# Patient Record
Sex: MALE | Race: WHITE | NOT HISPANIC OR LATINO | Employment: FULL TIME | URBAN - METROPOLITAN AREA
[De-identification: names, ages, dates, MRNs, and addresses within clinical notes are randomized per-mention and may not be internally consistent; named-entity substitution may affect disease eponyms.]

---

## 2017-01-19 ENCOUNTER — OFFICE VISIT (OUTPATIENT)
Dept: URGENT CARE | Facility: PHYSICIAN GROUP | Age: 52
End: 2017-01-19
Payer: COMMERCIAL

## 2017-01-19 VITALS
DIASTOLIC BLOOD PRESSURE: 86 MMHG | BODY MASS INDEX: 30.47 KG/M2 | HEART RATE: 83 BPM | WEIGHT: 186 LBS | RESPIRATION RATE: 16 BRPM | SYSTOLIC BLOOD PRESSURE: 122 MMHG | TEMPERATURE: 98.3 F | OXYGEN SATURATION: 96 %

## 2017-01-19 DIAGNOSIS — R07.89 FEELING OF CHEST TIGHTNESS: ICD-10-CM

## 2017-01-19 DIAGNOSIS — R06.02 SOB (SHORTNESS OF BREATH) ON EXERTION: ICD-10-CM

## 2017-01-19 DIAGNOSIS — J40 BRONCHITIS: ICD-10-CM

## 2017-01-19 DIAGNOSIS — R05.9 COUGH: ICD-10-CM

## 2017-01-19 DIAGNOSIS — J45.20 MILD INTERMITTENT ASTHMA WITHOUT COMPLICATION: ICD-10-CM

## 2017-01-19 PROCEDURE — 99214 OFFICE O/P EST MOD 30 MIN: CPT | Performed by: NURSE PRACTITIONER

## 2017-01-19 RX ORDER — METHYLPREDNISOLONE 4 MG/1
TABLET ORAL
Qty: 1 KIT | Refills: 0 | Status: CANCELLED | OUTPATIENT
Start: 2017-01-19

## 2017-01-19 RX ORDER — AZITHROMYCIN 250 MG/1
TABLET, FILM COATED ORAL
Qty: 6 TAB | Refills: 0 | Status: SHIPPED | OUTPATIENT
Start: 2017-01-19 | End: 2017-02-13

## 2017-01-19 RX ORDER — BENZONATATE 100 MG/1
100 CAPSULE ORAL 3 TIMES DAILY PRN
Qty: 30 CAP | Refills: 0 | Status: SHIPPED | OUTPATIENT
Start: 2017-01-19 | End: 2017-02-13

## 2017-01-19 RX ORDER — IPRATROPIUM BROMIDE AND ALBUTEROL SULFATE 2.5; .5 MG/3ML; MG/3ML
3 SOLUTION RESPIRATORY (INHALATION) ONCE
Status: COMPLETED | OUTPATIENT
Start: 2017-01-19 | End: 2017-01-19

## 2017-01-19 RX ORDER — ALBUTEROL SULFATE 90 UG/1
2 AEROSOL, METERED RESPIRATORY (INHALATION) EVERY 6 HOURS PRN
Qty: 8.5 G | Refills: 0 | Status: SHIPPED | OUTPATIENT
Start: 2017-01-19 | End: 2017-06-15

## 2017-01-19 RX ADMIN — IPRATROPIUM BROMIDE AND ALBUTEROL SULFATE 3 ML: 2.5; .5 SOLUTION RESPIRATORY (INHALATION) at 13:30

## 2017-01-19 ASSESSMENT — ENCOUNTER SYMPTOMS
SPUTUM PRODUCTION: 0
DIZZINESS: 0
PALPITATIONS: 0
COUGH: 1
BACK PAIN: 0
WHEEZING: 0
HEADACHES: 0
SORE THROAT: 0
CHILLS: 0
MYALGIAS: 0
ORTHOPNEA: 0
WEAKNESS: 0
FEVER: 0
SHORTNESS OF BREATH: 1

## 2017-01-19 NOTE — MR AVS SNAPSHOT
Sathya Guaman Dustin   2017 1:00 PM   Office Visit   MRN: [id number]    Department:  [facility]   Dept Phone:  [phone]    Description:  Male : 1965   Provider:  SHAWN Munson           Reason for Visit     Cough cough since before thanksgiving, SOB      Allergies as of 2017     No Known Allergies      You were diagnosed with     Cough   [786.2.ICD-9-CM]       Bronchitis   [770827]       Feeling of chest tightness   [445402]       SOB (shortness of breath) on exertion   [803087]       Mild intermittent asthma without complication   [829261]         Vital Signs     Blood Pressure Pulse Temperature Respirations Weight Oxygen Saturation    122/86 mmHg 83 36.8 °C (98.3 °F) 16 84.369 kg (186 lb) 96%    Smoking Status                   Former Smoker           Basic Information     Date Of Birth Sex Race Ethnicity Preferred Language    1965 Male White Non- English      Problem List              ICD-10-CM Priority Class Noted - Resolved    Essential hypertension I10   2015 - Present    Hyperlipidemia E78.5   2015 - Present    Vitamin D deficiency E55.9   2015 - Present    Low testosterone E29.1   2015 - Present    BMI 30.0-30.9,adult Z68.30   10/3/2016 - Present      Health Maintenance        Date Due Completion Dates    IMM INFLUENZA (1) 2016 ---    COLONOSCOPY 10/3/2025 10/3/2015 (Done)    Override on 10/3/2015: Done    IMM DTaP/Tdap/Td Vaccine (2 - Td) 10/3/2026 10/3/2016            Current Immunizations     Tdap Vaccine 10/3/2016      Below and/or attached are the medications your provider expects you to take. Review all of your home medications and newly ordered medications with your provider and/or pharmacist. Follow medication instructions as directed by your provider and/or pharmacist. Please keep your medication list with you and share with your provider. Update the information when medications are discontinued, doses are changed, or  new medications (including over-the-counter products) are added; and carry medication information at all times in the event of emergency situations     Allergies:  No Known Allergies          Medications  Valid as of: January 19, 2017 -  2:18 PM    Generic Name Brand Name Tablet Size Instructions for use    Cholecalciferol   Take  by mouth.        Lisinopril-Hydrochlorothiazide (Tab) PRINZIDE, ZESTORETIC 20-25 MG Take 1 Tab by mouth every day.        Multiple Vitamins-Minerals   Take  by mouth.        Omega-3 Fatty Acids (Cap) fish oil 1000 MG Take 1,000 mg by mouth 3 times a day, with meals.        .                 Medicines prescribed today were sent to:     [facility]/PHARMACY #[id number] - [place], [place] - [address]    [address] [address]    Phone: [phone] Fax: [phone]    Open 24 Hours?: No      Medication refill instructions:       If your prescription bottle indicates you have medication refills left, it is not necessary to call your provider’s office. Please contact your pharmacy and they will refill your medication.    If your prescription bottle indicates you do not have any refills left, you may request refills at any time through one of the following ways: The online [employer] system (except Urgent Care), by calling your provider’s office, or by asking your pharmacy to contact your provider’s office with a refill request. Medication refills are processed only during regular business hours and may not be available until the next business day. Your provider may request additional information or to have a follow-up visit with you prior to refilling your medication.   *Please Note: Medication refills are assigned a new Rx number when refilled electronically. Your pharmacy may indicate that no refills were authorized even though a new prescription for the same medication is available at the pharmacy. Please request the medicine by name with the pharmacy before contacting your  provider for a refill.           MyChart Access Code: Activation code not generated  Current MyChart Status: Active

## 2017-01-19 NOTE — PROGRESS NOTES
"Subjective:      Sathya Chan is a 51 y.o. male who presents with Cough            Cough  Associated symptoms include shortness of breath. Pertinent negatives include no chest pain, chills, ear pain, fever, headaches, myalgias, sore throat or wheezing. There is no history of environmental allergies.   Sathya is a 51 year old male who is here for cough since \"before Thanksgiving\". C/o dry hacking cough with some SOB with exertion and chest tightness. States h/o childhood asthma. No inhaler use. No OTC med for cough. Sleeping at night. Denies sinus problems, sore throat, fever.    PMH:  has a past medical history of Hypertension; Hyperlipidemia; and Asthma. He also has no past medical history of Diabetes (CMS-HCC).  MEDS:   Current outpatient prescriptions:   •  azithromycin (ZITHROMAX) 250 MG Tab, Take 2 tabs by mouth on day 1, then take 1 tab on days 2-5, Disp: 6 Tab, Rfl: 0  •  benzonatate (TESSALON) 100 MG Cap, Take 1 Cap by mouth 3 times a day as needed for Cough., Disp: 30 Cap, Rfl: 0  •  lisinopril-hydrochlorothiazide (PRINZIDE, ZESTORETIC) 20-25 MG per tablet, Take 1 Tab by mouth every day., Disp: 90 Tab, Rfl: 2  •  Cholecalciferol (VITAMIN D PO), Take  by mouth., Disp: , Rfl:   •  Omega-3 Fatty Acids (FISH OIL) 1000 MG Cap capsule, Take 1,000 mg by mouth 3 times a day, with meals., Disp: , Rfl:   •  Multiple Vitamins-Minerals (MULTIVITAMIN PO), Take  by mouth., Disp: , Rfl:     Current facility-administered medications:   •  ipratropium-albuterol (DUONEB) nebulizer solution 3 mL, 3 mL, Nebulization, Once, Karen Talbot, F.N.P.  ALLERGIES: No Known Allergies  SURGHX: History reviewed. No pertinent past surgical history.  SOCHX:  reports that he quit smoking about 4 years ago. His smoking use included Cigarettes. He has never used smokeless tobacco. He reports that he drinks about 1.0 oz of alcohol per week. He reports that he does not use illicit drugs.  FH: Family history was reviewed, no pertinent " findings to report      Review of Systems   Constitutional: Negative for fever, chills and malaise/fatigue.   HENT: Negative for congestion, ear pain and sore throat.    Respiratory: Positive for cough and shortness of breath. Negative for sputum production and wheezing.    Cardiovascular: Negative for chest pain, palpitations and orthopnea.   Musculoskeletal: Negative for myalgias and back pain.   Neurological: Negative for dizziness, weakness and headaches.   Endo/Heme/Allergies: Negative for environmental allergies.          Objective:     /86 mmHg  Pulse 83  Temp(Src) 36.8 °C (98.3 °F)  Resp 16  Wt 84.369 kg (186 lb)  SpO2 96%     Physical Exam   Constitutional: He is oriented to person, place, and time. He appears well-developed and well-nourished. No distress.   HENT:   Head: Normocephalic.   Eyes: Conjunctivae and EOM are normal. Pupils are equal, round, and reactive to light.   Neck: Normal range of motion. Neck supple.   Cardiovascular: Normal rate and regular rhythm.    Pulmonary/Chest: Effort normal and breath sounds normal. No accessory muscle usage. No respiratory distress. He has no decreased breath sounds. He has no wheezes. He has no rhonchi. He has no rales.   Musculoskeletal: Normal range of motion.   Neurological: He is alert and oriented to person, place, and time.   Skin: Skin is warm and dry. He is not diaphoretic.   Vitals reviewed.  Duo Neb breathing treatment: Patient has chest tightness, SOB, dry cough without wheeze or CP. Cough induced especially with deep breathing. After, patient states able to breathe deep without coughing. Air movement throughout. Post tx 100%.              Assessment/Plan:     1. Cough    - ipratropium-albuterol (DUONEB) nebulizer solution 3 mL; 3 mL by Nebulization route Once.  - benzonatate (TESSALON) 100 MG Cap; Take 1 Cap by mouth 3 times a day as needed for Cough.  Dispense: 30 Cap; Refill: 0  - albuterol 108 (90 BASE) MCG/ACT Aero Soln inhalation  aerosol; Inhale 2 Puffs by mouth every 6 hours as needed.  Dispense: 8.5 g; Refill: 0    2. Bronchitis    - azithromycin (ZITHROMAX) 250 MG Tab; Take 2 tabs by mouth on day 1, then take 1 tab on days 2-5  Dispense: 6 Tab; Refill: 0  - albuterol 108 (90 BASE) MCG/ACT Aero Soln inhalation aerosol; Inhale 2 Puffs by mouth every 6 hours as needed.  Dispense: 8.5 g; Refill: 0    3. Feeling of chest tightness    - albuterol 108 (90 BASE) MCG/ACT Aero Soln inhalation aerosol; Inhale 2 Puffs by mouth every 6 hours as needed.  Dispense: 8.5 g; Refill: 0    4. SOB (shortness of breath) on exertion    - ipratropium-albuterol (DUONEB) nebulizer solution 3 mL; 3 mL by Nebulization route Once.  - albuterol 108 (90 BASE) MCG/ACT Aero Soln inhalation aerosol; Inhale 2 Puffs by mouth every 6 hours as needed.  Dispense: 8.5 g; Refill: 0    5. Mild intermittent asthma without complication    - albuterol 108 (90 BASE) MCG/ACT Aero Soln inhalation aerosol; Inhale 2 Puffs by mouth every 6 hours as needed.  Dispense: 8.5 g; Refill: 0    Increase water intake  May use Ibuprofen/Tylenol prn for fever or body aches  Get rest  May use daily longer acting antihistamine like Claritin  Use inhaler prn for SOB with cough/chest tightness  May use OTC cough suppressant medications like Robitussin prn for nighttime   Monitor for fevers, productive cough, SOB, CP, chest tightness- need re-evaluation

## 2017-02-13 ENCOUNTER — OFFICE VISIT (OUTPATIENT)
Dept: MEDICAL GROUP | Facility: PHYSICIAN GROUP | Age: 52
End: 2017-02-13
Payer: COMMERCIAL

## 2017-02-13 VITALS
OXYGEN SATURATION: 98 % | HEART RATE: 67 BPM | BODY MASS INDEX: 28.28 KG/M2 | SYSTOLIC BLOOD PRESSURE: 118 MMHG | HEIGHT: 66 IN | WEIGHT: 176 LBS | DIASTOLIC BLOOD PRESSURE: 60 MMHG | TEMPERATURE: 98.2 F | RESPIRATION RATE: 16 BRPM

## 2017-02-13 DIAGNOSIS — Z87.09 HISTORY OF ASTHMA: ICD-10-CM

## 2017-02-13 DIAGNOSIS — I10 ESSENTIAL HYPERTENSION: ICD-10-CM

## 2017-02-13 DIAGNOSIS — E78.5 HYPERLIPIDEMIA, UNSPECIFIED HYPERLIPIDEMIA TYPE: ICD-10-CM

## 2017-02-13 PROCEDURE — 99214 OFFICE O/P EST MOD 30 MIN: CPT | Performed by: INTERNAL MEDICINE

## 2017-02-13 NOTE — ASSESSMENT & PLAN NOTE
"Chronic condition. He is not on a cholesterol medication - has been on in the past but d/dominique due to elevated LFT's. Continues taking  fish oil 1000 mg 2 tab once a day.  Patient states that there was a program that work for losing weight \"biggest loser, \" which had given him some motivation to lose weight. He notes he is interested in seeing a dietitian/nutritionist and is requesting referral.   Patients most recent cholesterol was reviewed:  Lab Results   Component Value Date/Time    CHOLESTEROL, 10/06/2016 06:08 AM    CHOLESTEROL, 09/25/2015 06:29 AM     Lab Results   Component Value Date/Time    * 10/06/2016 06:08 AM    * 09/25/2015 06:29 AM     Lab Results   Component Value Date/Time    HDL 40 10/06/2016 06:08 AM    HDL 37* 09/25/2015 06:29 AM     Lab Results   Component Value Date/Time    TRIGLYCERIDES 205* 10/06/2016 06:08 AM    TRIGLYCERIDES 306* 09/25/2015 06:29 AM     "

## 2017-02-13 NOTE — ASSESSMENT & PLAN NOTE
Chronic condition. He indicates that he is feeling well and denies any symptoms referable to his elevated blood pressure. Specifically denies chest pain, palpitations, dyspnea, orthopnea, PND or peripheral edema. Current medication regimen is as listed below. Taking medicines as directed daily and is not taking baby aspirin. He is not monitoring BP at home. Patient has these side effects of medication: none. Currently on Prinzide 20-25 milligrams daily.  Lab Results   Component Value Date/Time    SODIUM 139 10/06/2016 06:08 AM    POTASSIUM 3.8 10/06/2016 06:08 AM    CHLORIDE 102 10/06/2016 06:08 AM    CO2 29 10/06/2016 06:08 AM    GLUCOSE 85 10/06/2016 06:08 AM    BUN 17 10/06/2016 06:08 AM    CREATININE 0.96 10/06/2016 06:08 AM    ALKALINE PHOSPHATASE 53 10/06/2016 06:08 AM    AST(SGOT) 24 10/06/2016 06:08 AM    ALT(SGPT) 31 10/06/2016 06:08 AM    TOTAL BILIRUBIN 0.6 10/06/2016 06:08 AM

## 2017-02-13 NOTE — MR AVS SNAPSHOT
"Sathya Guaman Chan   2017 2:45 PM   Office Visit   MRN: [id number]    Department:  [facility]   Dept Phone:  [phone]    Description:  Male : 1965   Provider:  Jimmy Martinez D.O.           Reason for Visit     Follow-Up [facility], Bronchitis      Allergies as of 2017     No Known Allergies      You were diagnosed with     Essential hypertension   [3891134]       Hyperlipidemia, unspecified hyperlipidemia type   [4941755]       History of asthma   [691790]       BMI 28.0-28.9,adult   [489738]         Vital Signs     Blood Pressure Pulse Temperature Respirations Height Weight    118/60 mmHg 67 36.8 °C (98.2 °F) 16 1.664 m (5' 5.5\") 79.833 kg (176 lb)    Body Mass Index Oxygen Saturation Smoking Status             28.83 kg/m2 98% Former Smoker         Basic Information     Date Of Birth Sex Race Ethnicity Preferred Language    1965 Male White Non- English      Problem List              ICD-10-CM Priority Class Noted - Resolved    Essential hypertension I10   2015 - Present    Hyperlipidemia E78.5   2015 - Present    Vitamin D deficiency E55.9   2015 - Present    Low testosterone E29.1   2015 - Present    BMI 28.0-28.9,adult Z68.28   10/3/2016 - Present    History of asthma Z87.09   2017 - Present      Health Maintenance        Date Due Completion Dates    IMM INFLUENZA (1) 2016 ---    COLONOSCOPY 10/3/2025 10/3/2015 (Done)    Override on 10/3/2015: Done    IMM DTaP/Tdap/Td Vaccine (2 - Td) 10/3/2026 10/3/2016            Current Immunizations     Tdap Vaccine 10/3/2016      Below and/or attached are the medications your provider expects you to take. Review all of your home medications and newly ordered medications with your provider and/or pharmacist. Follow medication instructions as directed by your provider and/or pharmacist. Please keep your medication list with you and share with your provider. Update the information when medications " are discontinued, doses are changed, or new medications (including over-the-counter products) are added; and carry medication information at all times in the event of emergency situations     Allergies:  No Known Allergies          Medications  Valid as of: February 13, 2017 -  4:04 PM    Generic Name Brand Name Tablet Size Instructions for use    Albuterol Sulfate (Aero Soln) albuterol 108 (90 BASE) MCG/ACT Inhale 2 Puffs by mouth every 6 hours as needed.        Cholecalciferol   Take  by mouth.        Lisinopril-Hydrochlorothiazide (Tab) PRINZIDE, ZESTORETIC 20-25 MG Take 1 Tab by mouth every day.        Multiple Vitamins-Minerals   Take  by mouth.        Omega-3 Fatty Acids (Cap) fish oil 1000 MG Take 1,000 mg by mouth 3 times a day, with meals.        .                 Medicines prescribed today were sent to:     [facility]/PHARMACY #[id number] - [place], [place] - [address]    [address] [address]    Phone: [phone] Fax: [phone]    Open 24 Hours?: No      Medication refill instructions:       If your prescription bottle indicates you have medication refills left, it is not necessary to call your provider’s office. Please contact your pharmacy and they will refill your medication.    If your prescription bottle indicates you do not have any refills left, you may request refills at any time through one of the following ways: The online [employer] system (except Urgent Care), by calling your provider’s office, or by asking your pharmacy to contact your provider’s office with a refill request. Medication refills are processed only during regular business hours and may not be available until the next business day. Your provider may request additional information or to have a follow-up visit with you prior to refilling your medication.   *Please Note: Medication refills are assigned a new Rx number when refilled electronically. Your pharmacy may indicate that no refills were authorized even  though a new prescription for the same medication is available at the pharmacy. Please request the medicine by name with the pharmacy before contacting your provider for a refill.        Your To Do List     Future Labs/Procedures Complete By Expires    LIPID PROFILE  As directed 2/14/2018      Referral     A referral request has been sent to our patient care coordination department. Please allow 3-5 business days for us to process this request and contact you either by phone or mail. If you do not hear from us by the 5th business day, please call us at [phone].        Instructions    Cholesterol  Cholesterol is a fat. Your body needs a small amount of cholesterol. Cholesterol may build up in your blood vessels. This increases your chance of having a heart attack or stroke.  You cannot feel your cholesterol levels. The only way to know your cholesterol level is high is with a blood test. Keep your test results. Work with your doctor to keep your cholesterol at a good level.  WHAT DO THE TEST RESULTS MEAN?  · Total cholesterol is how much cholesterol is in your blood.  · LDL is bad cholesterol. This is the type that can build up. You want LDL to be low.  · HDL is good cholesterol. It cleans your blood vessels and carries LDL away. You want HDL to be high.  · Triglycerides are fat that the body can burn for energy or store.  WHAT ARE GOOD LEVELS OF CHOLESTEROL?  · Total cholesterol below 200.  · LDL below 100 for people at risk. Below 70 for those at very high risk.  · HDL above 50 is good. Above 60 is best.  · Triglycerides below 150.  HOW CAN I LOWER MY CHOLESTEROL?  · Diet. Follow your diet programs as told by your doctor.  ¨ Choose fish, white meat chicken, roasted turkey, or baked turkey. Try not to eat red meat, fried foods, or processed meats such as sausage and lunch meats.  ¨ Eat lots of fresh fruits and vegetables.  ¨ Choose whole grains, beans, pasta, potatoes, and cereals.  ¨ Use only small amounts  of olive, corn, or canola oils.  ¨ Try not to eat butter, mayonnaise, shortening, or palm kernel oils.  ¨ Try not to eat foods with trans fats.  ¨ Drink skim or nonfat milk. Eat low-fat or nonfat yogurt and cheeses. Try not to drink whole milk or cream. Try not to eat ice cream, egg yolks, and full-fat cheeses.  ¨ Healthy desserts include fanta food cake, jefferson snaps, animal crackers, hard candy, popsicles, and low-fat or nonfat frozen yogurt. Try not to eat pastries, cakes, pies, and cookies.  · Exercise. Follow your exercise programs as told by your doctor.  ¨ Be more active. You can try gardening, walking, or taking the stairs. Ask your doctor about how you can be more active.  · Medicine. Take medicine as told by your doctor.     This information is not intended to replace advice given to you by your health care provider. Make sure you discuss any questions you have with your health care provider.     Document Released: 03/16/2010 Document Revised: 01/08/2016 Document Reviewed: 10/01/2014  [employer] Interactive Patient Education ©2016 [employer]            [employer] Access Code: Activation code not generated  Current [employer] Status: Active

## 2017-02-13 NOTE — PROGRESS NOTES
Subjective:   Sathya Chan is a 51 y.o. male here today for multiple problems as listed below.      Patient is here for follow-up of urgent care visit from January 2017-diagnosed with bronchitis and treated with antibiotics. Patient notes that her chronic cough has improved to 3 weeks ago. He was concerned about mold infestation of his home affecting his immune system- found some water flooding under his house a few months ago and had the house treated and mold removed (did this on his own with protective equipment). Patient states that the dry cough had started at [facility] and had gotten worse and better and then worse again-he notes that he was doing some research about mold allergies and had made some dietary changes including cutting out sugar, wheat, coffee, certain nuts. Patient started taking probiotics. He states that he feels that this has helped his immune system to overcome infection. He does have a history of asthma but does not use an inhaler regularly-during the time he was prescribed an albuterol inhaler which she used for short time. He also mentions a history of childhood allergies to mold and [place] when he was living in [facility]. His family also did have some upper respiratory tract infections at that time. Today, symptoms are better and he would like to have labs checked for his cholesterol as he did make those dietary changes noted above.    Essential hypertension  Chronic condition. He indicates that he is feeling well and denies any symptoms referable to his elevated blood pressure. Specifically denies chest pain, palpitations, dyspnea, orthopnea, PND or peripheral edema. Current medication regimen is as listed below. Taking medicines as directed daily and is not taking baby aspirin. He is not monitoring BP at home. Patient has these side effects of medication: none. Currently on Prinzide 20-25 milligrams daily.  Lab Results   Component Value Date/Time    SODIUM 139  "10/06/2016 06:08 AM    POTASSIUM 3.8 10/06/2016 06:08 AM    CHLORIDE 102 10/06/2016 06:08 AM    CO2 29 10/06/2016 06:08 AM    GLUCOSE 85 10/06/2016 06:08 AM    BUN 17 10/06/2016 06:08 AM    CREATININE 0.96 10/06/2016 06:08 AM    ALKALINE PHOSPHATASE 53 10/06/2016 06:08 AM    AST(SGOT) 24 10/06/2016 06:08 AM    ALT(SGPT) 31 10/06/2016 06:08 AM    TOTAL BILIRUBIN 0.6 10/06/2016 06:08 AM       Hyperlipidemia  Chronic condition. He is not on a cholesterol medication - has been on in the past but d/dominique due to elevated LFT's. Continues taking  fish oil 1000 mg 2 tab once a day.  Patient states that there was a program that work for losing weight \"biggest loser, \" which had given him some motivation to lose weight. He notes he is interested in seeing a dietitian/nutritionist and is requesting referral.   Patients most recent cholesterol was reviewed:  Lab Results   Component Value Date/Time    CHOLESTEROL, 10/06/2016 06:08 AM    CHOLESTEROL, 09/25/2015 06:29 AM     Lab Results   Component Value Date/Time    * 10/06/2016 06:08 AM    * 09/25/2015 06:29 AM     Lab Results   Component Value Date/Time    HDL 40 10/06/2016 06:08 AM    HDL 37* 09/25/2015 06:29 AM     Lab Results   Component Value Date/Time    TRIGLYCERIDES 205* 10/06/2016 06:08 AM    TRIGLYCERIDES 306* 09/25/2015 06:29 AM            Current medicines (including changes today)  Current Outpatient Prescriptions   Medication Sig Dispense Refill   • Omega-3 Fatty Acids (FISH OIL) 1000 MG Cap capsule Take 1,000 mg by mouth 3 times a day, with meals.     • Multiple Vitamins-Minerals (MULTIVITAMIN PO) Take  by mouth.     • lisinopril-hydrochlorothiazide (PRINZIDE, ZESTORETIC) 20-25 MG per tablet Take 1 Tab by mouth every day. 90 Tab 2   • albuterol 108 (90 BASE) MCG/ACT Aero Soln inhalation aerosol Inhale 2 Puffs by mouth every 6 hours as needed. 8.5 g 0   • Cholecalciferol (VITAMIN D PO) Take  by mouth.       No current facility-administered " "medications for this visit.     He  has a past medical history of Hypertension; Hyperlipidemia; and Asthma. He also has no past medical history of Diabetes (CMS-[facility]).    ROS   No chest pain, no shortness of breath, no abdominal pain, no diarrhea/constipation, no urinary symptoms.     Objective:     Blood pressure 118/60, pulse 67, temperature 36.8 °C (98.2 °F), resp. rate 16, height 1.664 m (5' 5.5\"), weight 79.833 kg (176 lb), SpO2 98 %. Body mass index is 28.83 kg/(m^2).   Physical Exam:  Alert, oriented in no acute distress.  Eye contact is good, speech goal directed, affect calm  HEENT: conjunctiva non-injected, sclera non-icteric.  Lungs: clear to auscultation bilaterally with good excursion.  CV: regular rate and rhythm.  Ext: no edema, color normal, vascularity normal, temperature normal    Assessment and Plan:   The following treatment plan was discussed   1. Essential hypertension     2. Hyperlipidemia, unspecified hyperlipidemia type  LIPID PROFILE   3. History of asthma     4. BMI 28.0-28.9,adult  REFERRAL TO [facility] IMPROVEMENT [facility] (HIP) Services Requested:: [facility] Staff to Evaluate Best Program, Weight Management Program; Reason for Visit:: Overweight/Obesity     1. Controlled on current medications-continue Prinzide.  2. Triglycerides improved on fish oil. LDL slightly worsened. Discussed lifestyle modifications. Nutritionist referral as below  3. Not currently on daily inhalers. Continue albuterol as needed.  4. Encouraged weight loss with healthy diet and regular physical activity-referral done as requested for nutritionist.    Followup: Return in about 6 months (around 8/13/2017) for follow-up with PCP.         Please note that dictation has been dictated using voice recognition soft ware. I have made every reasonable attempt to correct obvious errors, but I expect that there are errors of grammar and possibly content that I did not discover before finalizing the note.      "

## 2017-02-13 NOTE — PATIENT INSTRUCTIONS
Cholesterol  Cholesterol is a fat. Your body needs a small amount of cholesterol. Cholesterol may build up in your blood vessels. This increases your chance of having a heart attack or stroke.  You cannot feel your cholesterol levels. The only way to know your cholesterol level is high is with a blood test. Keep your test results. Work with your doctor to keep your cholesterol at a good level.  WHAT DO THE TEST RESULTS MEAN?  · Total cholesterol is how much cholesterol is in your blood.  · LDL is bad cholesterol. This is the type that can build up. You want LDL to be low.  · HDL is good cholesterol. It cleans your blood vessels and carries LDL away. You want HDL to be high.  · Triglycerides are fat that the body can burn for energy or store.  WHAT ARE GOOD LEVELS OF CHOLESTEROL?  · Total cholesterol below 200.  · LDL below 100 for people at risk. Below 70 for those at very high risk.  · HDL above 50 is good. Above 60 is best.  · Triglycerides below 150.  HOW CAN I LOWER MY CHOLESTEROL?  · Diet. Follow your diet programs as told by your doctor.  ¨ Choose fish, white meat chicken, roasted turkey, or baked turkey. Try not to eat red meat, fried foods, or processed meats such as sausage and lunch meats.  ¨ Eat lots of fresh fruits and vegetables.  ¨ Choose whole grains, beans, pasta, potatoes, and cereals.  ¨ Use only small amounts of olive, corn, or canola oils.  ¨ Try not to eat butter, mayonnaise, shortening, or palm kernel oils.  ¨ Try not to eat foods with trans fats.  ¨ Drink skim or nonfat milk. Eat low-fat or nonfat yogurt and cheeses. Try not to drink whole milk or cream. Try not to eat ice cream, egg yolks, and full-fat cheeses.  ¨ Healthy desserts include fanta food cake, jefferson snaps, animal crackers, hard candy, popsicles, and low-fat or nonfat frozen yogurt. Try not to eat pastries, cakes, pies, and cookies.  · Exercise. Follow your exercise programs as told by your doctor.  ¨ Be more active. You can try  gardening, walking, or taking the stairs. Ask your doctor about how you can be more active.  · Medicine. Take medicine as told by your doctor.     This information is not intended to replace advice given to you by your health care provider. Make sure you discuss any questions you have with your health care provider.     Document Released: 03/16/2010 Document Revised: 01/08/2016 Document Reviewed: 10/01/2014  [employer] Interactive Patient Education ©2016 [employer]

## 2017-02-16 ENCOUNTER — HOSPITAL ENCOUNTER (OUTPATIENT)
Dept: LAB | Facility: MEDICAL CENTER | Age: 52
End: 2017-02-16
Attending: INTERNAL MEDICINE
Payer: COMMERCIAL

## 2017-02-16 DIAGNOSIS — E78.5 HYPERLIPIDEMIA, UNSPECIFIED HYPERLIPIDEMIA TYPE: ICD-10-CM

## 2017-02-16 LAB
CHOLEST SERPL-MCNC: 167 MG/DL (ref 100–199)
HDLC SERPL-MCNC: 69 MG/DL
LDLC SERPL CALC-MCNC: 90 MG/DL
TRIGL SERPL-MCNC: 39 MG/DL (ref 0–149)

## 2017-02-16 PROCEDURE — 36415 COLL VENOUS BLD VENIPUNCTURE: CPT

## 2017-02-16 PROCEDURE — 80061 LIPID PANEL: CPT

## 2017-06-15 ENCOUNTER — OFFICE VISIT (OUTPATIENT)
Dept: MEDICAL GROUP | Facility: MEDICAL CENTER | Age: 52
End: 2017-06-15
Payer: COMMERCIAL

## 2017-06-15 VITALS
WEIGHT: 167 LBS | OXYGEN SATURATION: 98 % | SYSTOLIC BLOOD PRESSURE: 132 MMHG | RESPIRATION RATE: 16 BRPM | BODY MASS INDEX: 27.82 KG/M2 | HEIGHT: 65 IN | HEART RATE: 77 BPM | TEMPERATURE: 97.2 F | DIASTOLIC BLOOD PRESSURE: 72 MMHG

## 2017-06-15 DIAGNOSIS — I10 ESSENTIAL HYPERTENSION: ICD-10-CM

## 2017-06-15 DIAGNOSIS — E55.9 VITAMIN D DEFICIENCY: ICD-10-CM

## 2017-06-15 DIAGNOSIS — E78.5 HYPERLIPIDEMIA, UNSPECIFIED HYPERLIPIDEMIA TYPE: ICD-10-CM

## 2017-06-15 PROCEDURE — 99213 OFFICE O/P EST LOW 20 MIN: CPT | Performed by: NURSE PRACTITIONER

## 2017-06-15 NOTE — ASSESSMENT & PLAN NOTE
Stable. Currently taking lisinopril-hctz20-25mg daily as directed.   He is not taking baby aspirin daily.   He is not monitoring BP at home.   Denies symptoms low BP: light-headed, tunnel-vision, unusual fatigue.   Denies symptoms high BP:pounding headache, visual changes, palpitations, flushed face.   Denies medicine side effects: unusual fatigue, slow heartbeat, foot/leg swelling, cough.

## 2017-06-15 NOTE — PROGRESS NOTES
Chief Complaint   Patient presents with   • Establish Care     Sathya Chan is a 51 y.o. male here to establish care and for evaluation and management of:    HPI:    Essential hypertension  Stable. Currently taking lisinopril-hctz20-25mg daily as directed.   He is not taking baby aspirin daily.   He is not monitoring BP at home.   Denies symptoms low BP: light-headed, tunnel-vision, unusual fatigue.   Denies symptoms high BP:pounding headache, visual changes, palpitations, flushed face.   Denies medicine side effects: unusual fatigue, slow heartbeat, foot/leg swelling, cough.    Hyperlipidemia  Stable. No meds  Reports diet is heart healthy  He is exercising regularly.  He is not taking ASA daily.  He denies dizziness, claudication, or chest pain.    Vitamin D deficiency  At goal per last labs. Taking multi with vitamin D    Current medicines (including changes today)  Current Outpatient Prescriptions   Medication Sig Dispense Refill   • Omega-3 Fatty Acids (FISH OIL) 1000 MG Cap capsule Take 1,000 mg by mouth 3 times a day, with meals.     • Multiple Vitamins-Minerals (MULTIVITAMIN PO) Take  by mouth.     • lisinopril-hydrochlorothiazide (PRINZIDE, ZESTORETIC) 20-25 MG per tablet Take 1 Tab by mouth every day. 90 Tab 2     No current facility-administered medications for this visit.     He  has a past medical history of Hypertension; Hyperlipidemia; and Asthma. He also has no past medical history of Diabetes (CMS-[facility]).  He  has no past surgical history on file.  Social History   Substance Use Topics   • Smoking status: Former Smoker     Types: Cigarettes     Quit date: 08/19/2012   • Smokeless tobacco: Never Used   • Alcohol Use: 1.0 oz/week     2 Cans of beer per week      Comment: 1-2 beers/day, weekends 5     Social History     Social History Narrative     Family History   Problem Relation Age of Onset   • Heart Disease Father    • Hypertension Father      Family Status   Relation Status Death Age   •  "Mother Alive    • Father Alive    • Daughter Alive          ROS  Constitutional: Negative for fever, chills/sweats   Respiratory: Negative for shortness of breath, PÉREZ  Cardiovascular: Negative for chest pain or pressure  GI/: Negative for diarrhea/constipation / urinary difficulty  All other systems reviewed and are negative except as per HPI.        Objective:     Blood pressure 132/72, pulse 77, temperature 36.2 °C (97.2 °F), resp. rate 16, height 1.651 m (5' 5\"), weight 75.751 kg (167 lb), SpO2 98 %. Body mass index is 27.79 kg/(m^2).  Physical Exam:    Constitutional: Alert, no distress.  Skin: Warm, dry, good turgor, no rashes in visible areas.  Eye: Equal, round and reactive, conjunctiva clear, lids normal.  ENMT: Lips without lesions, good dentition, oropharynx clear.  Neck: Trachea midline, no masses, no thyromegaly.  Respiratory: Unlabored respiratory effort, lungs clear to auscultation, no wheezes, no ronchi.  Cardiovascular: Normal S1, S2, no murmur, no edema.  Psych: Alert and oriented x3, normal affect and mood.        Assessment and Plan:   The following treatment plan was discussed   1. Essential hypertension      Chronic and stable and under good control on current regimen. Continue current regimen. Plan for labs in October of this year. No refill needed at this time.   2. Hyperlipidemia, unspecified hyperlipidemia type      Stable without meds. Continue fish oil. Heart healthy diet real physical activity. Plan to recheck in February 2018.   3. Vitamin D deficiency      Continue multivitamin with vitamin D. Plan to recheck when we do labs in October of this year.     Patient understands and agrees to plan of care.    Followup: Return in about 4 months (around 10/15/2017). or sooner for new symptoms or should new problems arise        This dictation was created using voice recognition software. The accuracy of the dictation is limited to the abilities of the software. I expect there may be some " errors of grammar and possibly content. The MA notes were reviewed and certain aspects of this information were incorporated into this note.

## 2017-06-15 NOTE — MR AVS SNAPSHOT
"Sathya Chan   6/15/2017 11:00 AM   Office Visit   MRN: [id number]    Department:  [address] [place] [address]   Dept Phone:  [phone]    Description:  Male : 1965   Provider:  JUSTO Granados           Reason for Visit     Establish Care           Allergies as of 6/15/2017     No Known Allergies      Vital Signs     Blood Pressure Pulse Temperature Respirations Height Weight    132/72 mmHg 77 36.2 °C (97.2 °F) 16 1.651 m (5' 5\") 75.751 kg (167 lb)    Body Mass Index Oxygen Saturation Smoking Status             27.79 kg/m2 98% Former Smoker         Basic Information     Date Of Birth Sex Race Ethnicity Preferred Language    1965 Male White Non- English      Your appointments     Oct 17, 2017 11:00 AM   Established Patient with JUSTO Grandaos   [facility] [address]    [address]  [address]  [facility] [id number] [phone]           You will be receiving a confirmation call a few days before your appointment from our automated call confirmation system.              Problem List              ICD-10-CM Priority Class Noted - Resolved    Essential hypertension I10   2015 - Present    Hyperlipidemia E78.5   2015 - Present    Vitamin D deficiency E55.9   2015 - Present    BMI 28.0-28.9,adult Z68.28   10/3/2016 - Present    History of asthma Z87.09   2017 - Present      Health Maintenance        Date Due Completion Dates    COLONOSCOPY 10/3/2025 10/3/2015 (Done)    Override on 10/3/2015: Done    IMM DTaP/Tdap/Td Vaccine (2 - Td) 10/3/2026 10/3/2016            Current Immunizations     Tdap Vaccine 10/3/2016      Below and/or attached are the medications your provider expects you to take. Review all of your home medications and newly ordered medications with your provider and/or pharmacist. Follow medication instructions as directed by your provider and/or pharmacist. Please keep your medication list with you and share with your provider. " Update the information when medications are discontinued, doses are changed, or new medications (including over-the-counter products) are added; and carry medication information at all times in the event of emergency situations     Allergies:  No Known Allergies          Medications  Valid as of: Antonieta 15, 2017 - 11:14 AM    Generic Name Brand Name Tablet Size Instructions for use    Lisinopril-Hydrochlorothiazide (Tab) PRINZIDE, ZESTORETIC 20-25 MG Take 1 Tab by mouth every day.        Multiple Vitamins-Minerals   Take  by mouth.        Omega-3 Fatty Acids (Cap) fish oil 1000 MG Take 1,000 mg by mouth 3 times a day, with meals.        .                 Medicines prescribed today were sent to:     [id number] - [place], [place] - [address]    [address] [address]    Phone: [phone] Fax: [phone]    Open 24 Hours?: No      Medication refill instructions:       If your prescription bottle indicates you have medication refills left, it is not necessary to call your provider’s office. Please contact your pharmacy and they will refill your medication.    If your prescription bottle indicates you do not have any refills left, you may request refills at any time through one of the following ways: The online [employer] system (except Urgent Care), by calling your provider’s office, or by asking your pharmacy to contact your provider’s office with a refill request. Medication refills are processed only during regular business hours and may not be available until the next business day. Your provider may request additional information or to have a follow-up visit with you prior to refilling your medication.   *Please Note: Medication refills are assigned a new Rx number when refilled electronically. Your pharmacy may indicate that no refills were authorized even though a new prescription for the same medication is available at the pharmacy. Please request the medicine by name with the  pharmacy before contacting your provider for a refill.           MyChart Access Code: Activation code not generated  Current MyChart Status: Active

## 2017-06-15 NOTE — ASSESSMENT & PLAN NOTE
Pt states she's been having vaginal discharge and odor for 10 days now. Pt states she gets these infections frequently and was given Flagyl before for this. Pt would like a rx for Flagyl.    Stable. No meds  Reports diet is heart healthy  He is exercising regularly.  He is not taking ASA daily.  He denies dizziness, claudication, or chest pain.

## 2017-07-06 RX ORDER — LISINOPRIL AND HYDROCHLOROTHIAZIDE 25; 20 MG/1; MG/1
TABLET ORAL
Qty: 90 TAB | Refills: 0 | Status: SHIPPED | OUTPATIENT
Start: 2017-07-06 | End: 2017-10-03 | Stop reason: SDUPTHER

## 2017-07-14 ENCOUNTER — TELEPHONE (OUTPATIENT)
Dept: MEDICAL GROUP | Facility: MEDICAL CENTER | Age: 52
End: 2017-07-14

## 2017-07-14 NOTE — TELEPHONE ENCOUNTER
1. Caller Name: Sathya Deniz Harmon                                           Call Back Number: [phone] (home)         Patient approves a detailed voicemail message: N\A    Patient left VM but did not leave a detailed message about what he needed, I called the patient but he did not answer to see how I could assist him, I let the patient know to call back to see how I could help.

## 2017-07-19 ENCOUNTER — OFFICE VISIT (OUTPATIENT)
Dept: MEDICAL GROUP | Facility: MEDICAL CENTER | Age: 52
End: 2017-07-19
Payer: COMMERCIAL

## 2017-07-19 VITALS
WEIGHT: 168 LBS | HEIGHT: 65 IN | HEART RATE: 90 BPM | SYSTOLIC BLOOD PRESSURE: 118 MMHG | TEMPERATURE: 98.7 F | BODY MASS INDEX: 27.99 KG/M2 | RESPIRATION RATE: 16 BRPM | DIASTOLIC BLOOD PRESSURE: 62 MMHG | OXYGEN SATURATION: 96 %

## 2017-07-19 DIAGNOSIS — N52.9 VASCULOGENIC ERECTILE DYSFUNCTION, UNSPECIFIED VASCULOGENIC ERECTILE DYSFUNCTION TYPE: ICD-10-CM

## 2017-07-19 DIAGNOSIS — G47.00 INSOMNIA, UNSPECIFIED TYPE: ICD-10-CM

## 2017-07-19 PROCEDURE — 99214 OFFICE O/P EST MOD 30 MIN: CPT | Performed by: NURSE PRACTITIONER

## 2017-07-19 RX ORDER — DIAZEPAM 5 MG/1
TABLET ORAL
Qty: 30 TAB | Refills: 0 | Status: SHIPPED | OUTPATIENT
Start: 2017-07-19 | End: 2017-09-01

## 2017-07-19 RX ORDER — SILDENAFIL 100 MG/1
100 TABLET, FILM COATED ORAL PRN
Qty: 10 TAB | Refills: 1 | Status: SHIPPED | OUTPATIENT
Start: 2017-07-19 | End: 2017-09-15

## 2017-07-19 NOTE — ASSESSMENT & PLAN NOTE
Pt reports recent family stress and is having difficulties sleeping and feels anxious at times  He and his wife are going to counseling together  He would like something to help him sleep  He has never tried any prescription sleep aid  He has tried otc benadryl but this is not helpful

## 2017-07-19 NOTE — PROGRESS NOTES
"Subjective:     Chief Complaint   Patient presents with   • Difficulty Sleeping     for about 2 weeks     Sathya Chan is a 51 y.o. male here today for evaluation and management of:    Vasculogenic erectile dysfunction  Has been having difficulties getting and maintaining erection  Is not taking nitro  Normotensive  Would like to try Viagra    Insomnia  Pt reports recent family stress and is having difficulties sleeping and feels anxious at times  He and his wife are going to counseling together  He would like something to help him sleep  He has never tried any prescription sleep aid  He has tried otc benadryl but this is not helpful       Current medicines (including changes today)  Current Outpatient Prescriptions   Medication Sig Dispense Refill   • diazepam (VALIUM) 5 MG Tab Take 1-2 tablet as needed at bedtime as needed for sleep 30 Tab 0   • sildenafil citrate (VIAGRA) 100 MG tablet Take 1 Tab by mouth as needed for Erectile Dysfunction. 10 Tab 1   • lisinopril-hydrochlorothiazide (PRINZIDE, ZESTORETIC) 20-25 MG per tablet TAKE 1 TAB BY MOUTH EVERY DAY. 90 Tab 0   • Omega-3 Fatty Acids (FISH OIL) 1000 MG Cap capsule Take 1,000 mg by mouth 3 times a day, with meals.     • Multiple Vitamins-Minerals (MULTIVITAMIN PO) Take  by mouth.       No current facility-administered medications for this visit.     He  has a past medical history of Hypertension; Hyperlipidemia; and Asthma. He also has no past medical history of Diabetes (CMS-[facility]).    HM: utd  ROS   Constitutional: Negative for fever, chills/sweats   Respiratory: Negative for shortness of breath, PÉREZ  Cardiovascular: Negative for chest pain or pressure  GI/: Negative for diarrhea/constipation / urinary difficulty  All other systems reviewed and are negative except as per HPI.          Objective:     Blood pressure 118/62, pulse 90, temperature 37.1 °C (98.7 °F), resp. rate 16, height 1.651 m (5' 5\"), weight 76.204 kg (168 lb), SpO2 96 %. Body mass " index is 27.96 kg/(m^2).   Physical Exam:  Alert, oriented in no acute distress.  Eye contact is good, speech goal directed, affect calm  HEENT: conjunctiva non-injected, sclera non-icteric.  Pinna normal.   Oral mucous membranes pink and moist with no lesions.  Neck No adenopathy or masses in the neck or supraclavicular regions.  Lungs: clear to auscultation bilaterally with good excursion.  CV: regular rate and rhythm.  Ext: no edema, color normal, vascularity normal, temperature normal  Psych: Alert and oriented x3, normal affect and mood.  Skin: Warm, dry, good turgor, no rashes in visible areas.      Assessment and Plan:   The following treatment plan was discussed   1. Insomnia, unspecified type  diazepam (VALIUM) 5 MG Tab    trial of 5mg valium sent to pharmacy. advised to take 1-2 prn as needed for sleep. advised sparing use and avoid alcohol. risks and benefits discussed.    2. Vasculogenic erectile dysfunction, unspecified vasculogenic erectile dysfunction type  sildenafil citrate (VIAGRA) 100 MG tablet    rx for 100mg Viagra sent to pharmacy on record. risk and benefits discussed.        Patient understands and agrees to plan of care.      Followup: Return if symptoms worsen or fail to improve. or sooner for new symptoms or should new problems arise      This dictation was created using voice recognition software. The accuracy of the dictation is limited to the abilities of the software. I expect there may be some errors of grammar and possibly content. The MA notes were reviewed and certain aspects of this information were incorporated into this note.

## 2017-07-19 NOTE — ASSESSMENT & PLAN NOTE
Has been having difficulties getting and maintaining erection  Is not taking nitro  Normotensive  Would like to try Viagra

## 2017-07-19 NOTE — MR AVS SNAPSHOT
"        Sathya Guaman Dustin   2017 2:40 PM   Office Visit   MRN: [id number]    Department:  [address] [place] [address]   Dept Phone:  [phone]    Description:  Male : 1965   Provider:  JUSTO Granados           Reason for Visit     Difficulty Sleeping for about 2 weeks      Allergies as of 2017     No Known Allergies      You were diagnosed with     Insomnia, unspecified type   [2692846]       Vasculogenic erectile dysfunction, unspecified vasculogenic erectile dysfunction type   [[id number]]         Vital Signs     Blood Pressure Pulse Temperature Respirations Height Weight    118/62 mmHg 90 37.1 °C (98.7 °F) 16 1.651 m (5' 5\") 76.204 kg (168 lb)    Body Mass Index Oxygen Saturation Smoking Status             27.96 kg/m2 96% Former Smoker         Basic Information     Date Of Birth Sex Race Ethnicity Preferred Language    1965 Male White Non- English      Your appointments     Oct 20, 2017  3:00 PM   NEW TO YOU with Gabe Simon M.D.   [facility] [address]    [address] [facility] [address]  [facility] [address]   [phone]              Problem List              ICD-10-CM Priority Class Noted - Resolved    Essential hypertension I10   2015 - Present    Hyperlipidemia E78.5   2015 - Present    Vitamin D deficiency E55.9   2015 - Present    BMI 28.0-28.9,adult Z68.28   10/3/2016 - Present    History of asthma Z87.09   2017 - Present      Health Maintenance        Date Due Completion Dates    IMM INFLUENZA (1) 2017 ---    COLONOSCOPY 10/3/2025 10/3/2015 (Done)    Override on 10/3/2015: Done    IMM DTaP/Tdap/Td Vaccine (2 - Td) 10/3/2026 10/3/2016            Current Immunizations     Tdap Vaccine 10/3/2016      Below and/or attached are the medications your provider expects you to take. Review all of your home medications and newly ordered medications with your provider and/or pharmacist. Follow medication instructions as directed by your provider " and/or pharmacist. Please keep your medication list with you and share with your provider. Update the information when medications are discontinued, doses are changed, or new medications (including over-the-counter products) are added; and carry medication information at all times in the event of emergency situations     Allergies:  No Known Allergies          Medications  Valid as of: July 19, 2017 -  3:15 PM    Generic Name Brand Name Tablet Size Instructions for use    DiazePAM (Tab) VALIUM 5 MG Take 1-2 tablet as needed at bedtime as needed for sleep        Lisinopril-Hydrochlorothiazide (Tab) PRINZIDE, ZESTORETIC 20-25 MG TAKE 1 TAB BY MOUTH EVERY DAY.        Multiple Vitamins-Minerals   Take  by mouth.        Omega-3 Fatty Acids (Cap) fish oil 1000 MG Take 1,000 mg by mouth 3 times a day, with meals.        Sildenafil Citrate (Tab) VIAGRA 100 MG Take 1 Tab by mouth as needed for Erectile Dysfunction.        .                 Medicines prescribed today were sent to:     [facility] #[id number] - [place], [place] - [address] [facility]    [address] [facility] [address]    Phone: [phone] Fax: [phone]    Open 24 Hours?: No      Medication refill instructions:       If your prescription bottle indicates you have medication refills left, it is not necessary to call your provider’s office. Please contact your pharmacy and they will refill your medication.    If your prescription bottle indicates you do not have any refills left, you may request refills at any time through one of the following ways: The online Allovue system (except Urgent Care), by calling your provider’s office, or by asking your pharmacy to contact your provider’s office with a refill request. Medication refills are processed only during regular business hours and may not be available until the next business day. Your provider may request additional information or to have a follow-up visit with you prior to refilling your medication.      *Please Note: Medication refills are assigned a new Rx number when refilled electronically. Your pharmacy may indicate that no refills were authorized even though a new prescription for the same medication is available at the pharmacy. Please request the medicine by name with the pharmacy before contacting your provider for a refill.           Boardganicshart Access Code: Activation code not generated  Current MailWriter Status: Active

## 2017-07-26 NOTE — TELEPHONE ENCOUNTER
Phone Number Called: [phone]    Message: Left Vm for the patient to call back regarding the message below.    Left Message for patient to call back: N\A

## 2017-07-31 DIAGNOSIS — G47.00 INSOMNIA, UNSPECIFIED TYPE: ICD-10-CM

## 2017-07-31 RX ORDER — DIAZEPAM 5 MG/1
TABLET ORAL
Qty: 30 TAB
Start: 2017-07-31

## 2017-08-01 ENCOUNTER — OFFICE VISIT (OUTPATIENT)
Dept: MEDICAL GROUP | Facility: MEDICAL CENTER | Age: 52
End: 2017-08-01
Payer: COMMERCIAL

## 2017-08-01 VITALS
HEART RATE: 89 BPM | RESPIRATION RATE: 16 BRPM | SYSTOLIC BLOOD PRESSURE: 122 MMHG | HEIGHT: 65 IN | DIASTOLIC BLOOD PRESSURE: 74 MMHG | WEIGHT: 161 LBS | BODY MASS INDEX: 26.82 KG/M2 | OXYGEN SATURATION: 96 % | TEMPERATURE: 97.7 F

## 2017-08-01 DIAGNOSIS — F43.9 STRESS AT HOME: ICD-10-CM

## 2017-08-01 DIAGNOSIS — F51.02 ADJUSTMENT INSOMNIA: ICD-10-CM

## 2017-08-01 DIAGNOSIS — F41.9 ANXIETY: ICD-10-CM

## 2017-08-01 PROBLEM — Z87.09 HISTORY OF ASTHMA: Status: RESOLVED | Noted: 2017-02-13 | Resolved: 2017-08-01

## 2017-08-01 PROCEDURE — 99214 OFFICE O/P EST MOD 30 MIN: CPT | Performed by: FAMILY MEDICINE

## 2017-08-01 RX ORDER — BUSPIRONE HYDROCHLORIDE 10 MG/1
10 TABLET ORAL 2 TIMES DAILY
Qty: 120 TAB | Refills: 1 | Status: SHIPPED | OUTPATIENT
Start: 2017-08-01 | End: 2017-09-15 | Stop reason: SDUPTHER

## 2017-08-01 RX ORDER — TRAZODONE HYDROCHLORIDE 100 MG/1
100-200 TABLET ORAL
Qty: 60 TAB | Refills: 1 | Status: SHIPPED | OUTPATIENT
Start: 2017-08-01 | End: 2017-09-01

## 2017-08-01 NOTE — TELEPHONE ENCOUNTER
Patient has been advised per doctor, patient needs an appointment, Patient has an appointment with Dr. Simon on 8/1 at 10:40.

## 2017-08-01 NOTE — PROGRESS NOTES
cc: Insomnia    Subjective:     Sathya Chan is a 51 y.o. male presenting to discuss insomnia and anxiety. He reports that in July, he found out that his wife was having an affair. They are currently undergoing counseling, he is quite committed in this relationship, he is not sure about his wife. He also plans to start counseling on his own. For the past month or so has been feeling quite anxious, worried, perseverating, subsequent sleep difficulties. He was prescribed Valium 5-10 mg at night about 2 weeks ago. He finds the Valium unhelpful. He did take 15 mg one night, was able to sleep then. He tried an over-the-counter medication for sleep, did not find it helpful. He cannot remember the name of it. Previous note from his PCP indicates it could've been Benadryl. He does not drink alcohol currently. Previous to July, he was drinking about 3 beers 3 times a week.    Review of systems:  See above.      Current outpatient prescriptions:   •  busPIRone (BUSPAR) 10 MG Tab, Take 1 Tab by mouth 2 times a day., Disp: 120 Tab, Rfl: 1  •  trazodone (DESYREL) 100 MG Tab, Take 1-2 Tabs by mouth at bedtime as needed for Sleep., Disp: 60 Tab, Rfl: 1  •  diazepam (VALIUM) 5 MG Tab, Take 1-2 tablet as needed at bedtime as needed for sleep, Disp: 30 Tab, Rfl: 0  •  lisinopril-hydrochlorothiazide (PRINZIDE, ZESTORETIC) 20-25 MG per tablet, TAKE 1 TAB BY MOUTH EVERY DAY., Disp: 90 Tab, Rfl: 0  •  sildenafil citrate (VIAGRA) 100 MG tablet, Take 1 Tab by mouth as needed for Erectile Dysfunction., Disp: 10 Tab, Rfl: 1  •  Omega-3 Fatty Acids (FISH OIL) 1000 MG Cap capsule, Take 1,000 mg by mouth 3 times a day, with meals., Disp: , Rfl:   •  Multiple Vitamins-Minerals (MULTIVITAMIN PO), Take  by mouth., Disp: , Rfl:     No Known Allergies    Past Medical History   Diagnosis Date   • Hypertension    • Hyperlipidemia    • Asthma      History reviewed. No pertinent past surgical history.  Family History   Problem Relation Age of Onset  "  • Heart Disease Father    • Hypertension Father      Social History     Social History   • Marital Status:      Spouse Name: N/A   • Number of Children: N/A   • Years of Education: N/A     Occupational History   • Not on file.     Social History Main Topics   • Smoking status: Former Smoker     Types: Cigarettes     Quit date: 08/19/2012   • Smokeless tobacco: Never Used   • Alcohol Use: 1.0 oz/week     2 Cans of beer per week      Comment: 1-2 beers/couple times a week   • Drug Use: No      Comment: Past   • Sexual Activity:     Partners: Female     Other Topics Concern   • Not on file     Social History Narrative       Objective:     Vitals: /74 mmHg  Pulse 89  Temp(Src) 36.5 °C (97.7 °F)  Resp 16  Ht 1.651 m (5' 5\")  Wt 73.029 kg (161 lb)  BMI 26.79 kg/m2  SpO2 96%  General: Alert, pleasant, NAD, emotional  HEENT: Normocephalic.    Psych:  Affect/mood is normal, judgement is good, memory is intact, grooming is appropriate.    Assessment/Plan:     Sathya was seen today for medication management.    Diagnoses and all orders for this visit:    Anxiety  Adjustment insomnia  Stress at home  Encouraged him to continue the counseling. We'll stop the Valium. We'll try trazodone at night for sleep. We'll also start buspirone twice a day for his anxiety. Follow-up in 4 weeks for a medication check  -     busPIRone (BUSPAR) 10 MG Tab; Take 1 Tab by mouth 2 times a day.  -     trazodone (DESYREL) 100 MG Tab; Take 1-2 Tabs by mouth at bedtime as needed for Sleep.      Patient was seen for a total of 25 minutes face-to-face, with more than half of the time spent counseling or coordinating care regarding the above mentioned problems.       Return in about 4 weeks (around 8/29/2017) for Med check.  "

## 2017-08-01 NOTE — MR AVS SNAPSHOT
"        Sathya Chan   2017 10:40 AM   Office Visit   MRN: [id number]    Department:  [address] [place] [address]   Dept Phone:  [phone]    Description:  Male : 1965   Provider:  Gabe Simon M.D.           Reason for Visit     Medication Management           Allergies as of 2017     No Known Allergies      You were diagnosed with     Anxiety   [[id number]]       Adjustment insomnia   [980217]       Stress at home   [[id number]]         Vital Signs     Blood Pressure Pulse Temperature Respirations Height Weight    122/74 mmHg 89 36.5 °C (97.7 °F) 16 1.651 m (5' 5\") 73.029 kg (161 lb)    Body Mass Index Oxygen Saturation Smoking Status             26.79 kg/m2 96% Former Smoker         Basic Information     Date Of Birth Sex Race Ethnicity Preferred Language    1965 Male White Non- English      Your appointments     Sep 01, 2017  9:40 AM   NEW TO YOU with Gabe Simon M.D.   [facility] [address] [facility])    [address] [place] [address] [address]  [facility] [address]   [phone]              Problem List              ICD-10-CM Priority Class Noted - Resolved    Essential hypertension I10   2015 - Present    Hyperlipidemia E78.5   2015 - Present    Vitamin D deficiency E55.9   2015 - Present    Insomnia G47.00   2017 - Present    Vasculogenic erectile dysfunction N52.9   2017 - Present    Anxiety F41.9   2017 - Present    Stress at home F43.9   2017 - Present      Health Maintenance        Date Due Completion Dates    IMM INFLUENZA (1) 2017 ---    COLONOSCOPY 10/3/2025 10/3/2015 (Done)    Override on 10/3/2015: Done    IMM DTaP/Tdap/Td Vaccine (2 - Td) 10/3/2026 10/3/2016            Current Immunizations     Tdap Vaccine 10/3/2016      Below and/or attached are the medications your provider expects you to take. Review all of your home medications and newly ordered medications with your provider and/or pharmacist. Follow medication instructions as " directed by your provider and/or pharmacist. Please keep your medication list with you and share with your provider. Update the information when medications are discontinued, doses are changed, or new medications (including over-the-counter products) are added; and carry medication information at all times in the event of emergency situations     Allergies:  No Known Allergies          Medications  Valid as of: August 01, 2017 - 11:05 AM    Generic Name Brand Name Tablet Size Instructions for use    BusPIRone HCl (Tab) BUSPAR 10 MG Take 1 Tab by mouth 2 times a day.        DiazePAM (Tab) VALIUM 5 MG Take 1-2 tablet as needed at bedtime as needed for sleep        Lisinopril-Hydrochlorothiazide (Tab) PRINZIDE, ZESTORETIC 20-25 MG TAKE 1 TAB BY MOUTH EVERY DAY.        Multiple Vitamins-Minerals   Take  by mouth.        Omega-3 Fatty Acids (Cap) fish oil 1000 MG Take 1,000 mg by mouth 3 times a day, with meals.        Sildenafil Citrate (Tab) VIAGRA 100 MG Take 1 Tab by mouth as needed for Erectile Dysfunction.        TraZODone HCl (Tab) DESYREL 100 MG Take 1-2 Tabs by mouth at bedtime as needed for Sleep.        .                 Medicines prescribed today were sent to:     [facility]/PHARM[facility]Y #[id number] - [place], [place] - [address] [facility]    [address] [facility] [address]    Phone: [phone] Fax: [phone]    Open 24 Hours?: No      Medication refill instructions:       If your prescription bottle indicates you have medication refills left, it is not necessary to call your provider’s office. Please contact your pharmacy and they will refill your medication.    If your prescription bottle indicates you do not have any refills left, you may request refills at any time through one of the following ways: The online Tallyfy system (except Urgent Care), by calling your provider’s office, or by asking your pharmacy to contact your provider’s office with a refill request. Medication refills are processed only during  regular business hours and may not be available until the next business day. Your provider may request additional information or to have a follow-up visit with you prior to refilling your medication.   *Please Note: Medication refills are assigned a new Rx number when refilled electronically. Your pharmacy may indicate that no refills were authorized even though a new prescription for the same medication is available at the pharmacy. Please request the medicine by name with the pharmacy before contacting your provider for a refill.           Refresh Body Access Code: Activation code not generated  Current Refresh Body Status: Active

## 2017-09-01 ENCOUNTER — OFFICE VISIT (OUTPATIENT)
Dept: MEDICAL GROUP | Facility: MEDICAL CENTER | Age: 52
End: 2017-09-01
Payer: COMMERCIAL

## 2017-09-01 VITALS
HEART RATE: 72 BPM | WEIGHT: 162 LBS | DIASTOLIC BLOOD PRESSURE: 72 MMHG | RESPIRATION RATE: 14 BRPM | SYSTOLIC BLOOD PRESSURE: 118 MMHG | OXYGEN SATURATION: 98 % | HEIGHT: 65 IN | BODY MASS INDEX: 26.99 KG/M2 | TEMPERATURE: 98 F

## 2017-09-01 DIAGNOSIS — F41.9 ANXIETY: ICD-10-CM

## 2017-09-01 DIAGNOSIS — E78.5 HYPERLIPIDEMIA, UNSPECIFIED HYPERLIPIDEMIA TYPE: ICD-10-CM

## 2017-09-01 DIAGNOSIS — Z23 NEED FOR VACCINATION: ICD-10-CM

## 2017-09-01 DIAGNOSIS — F51.02 ADJUSTMENT INSOMNIA: ICD-10-CM

## 2017-09-01 DIAGNOSIS — F43.9 STRESS AT HOME: ICD-10-CM

## 2017-09-01 DIAGNOSIS — I10 ESSENTIAL HYPERTENSION: ICD-10-CM

## 2017-09-01 DIAGNOSIS — E55.9 VITAMIN D DEFICIENCY: ICD-10-CM

## 2017-09-01 PROCEDURE — 99214 OFFICE O/P EST MOD 30 MIN: CPT | Mod: 25 | Performed by: FAMILY MEDICINE

## 2017-09-01 PROCEDURE — 90471 IMMUNIZATION ADMIN: CPT | Performed by: FAMILY MEDICINE

## 2017-09-01 PROCEDURE — 90732 PPSV23 VACC 2 YRS+ SUBQ/IM: CPT | Performed by: FAMILY MEDICINE

## 2017-09-01 RX ORDER — TRAZODONE HYDROCHLORIDE 100 MG/1
150 TABLET ORAL
COMMUNITY
Start: 2017-09-01 | End: 2017-12-15

## 2017-09-01 NOTE — PROGRESS NOTES
cc:  anxiety    Subjective:     Sathya Chan is a 52 y.o. male presenting for a follow up and to establish care:    1. Anxiety, stress at home: Has been on buspirone 10 mg twice a day for about a month. He denies any side effects except for a slight tremor that is rare and intermittent. He notes that his anxiety is much improved. Is able to cope with stressful situations better. He continues to go to counseling, does individual counseling and couples counseling with his wife. He has also been able to sleep better with the trazodone 150 mg. He had a very dry mouth if he took 200 mg. Denies any depression, hallucinations, manic episodes. PHQ9 is 1and VIVIAN 7 is 5.    2. HTN: Has hx of arterial hypertension.  Is on lisinopril-hydrochlorothiazide 20-25 mg daily.  Denies any chest pain, shortness of breath, leg swelling, jaw claudication, lightheadedness, dizziness. Last CMP was October 2016. Last lipid panel was February 2017. He has been eating healthier, only takes fish oil for his cholesterol.    3. Vitamin D deficiency: History of decreased levels. Last level in October 2016 was normal. Takes multivitamin    Review of systems:  See above.        Current Outpatient Prescriptions:   •  Probiotic Product (PROBIOTIC DAILY PO), Take  by mouth., Disp: , Rfl:   •  STEVAN ROOT PO, Take  by mouth., Disp: , Rfl:   •  trazodone (DESYREL) 100 MG Tab, Take 1.5 Tabs by mouth at bedtime as needed for Sleep., Disp: , Rfl:   •  busPIRone (BUSPAR) 10 MG Tab, Take 1 Tab by mouth 2 times a day., Disp: 120 Tab, Rfl: 1  •  sildenafil citrate (VIAGRA) 100 MG tablet, Take 1 Tab by mouth as needed for Erectile Dysfunction., Disp: 10 Tab, Rfl: 1  •  lisinopril-hydrochlorothiazide (PRINZIDE, ZESTORETIC) 20-25 MG per tablet, TAKE 1 TAB BY MOUTH EVERY DAY., Disp: 90 Tab, Rfl: 0  •  Omega-3 Fatty Acids (FISH OIL) 1000 MG Cap capsule, Take 1,000 mg by mouth 3 times a day, with meals., Disp: , Rfl:   •  Multiple Vitamins-Minerals (MULTIVITAMIN  "PO), Take  by mouth., Disp: , Rfl:     No Known Allergies    Past Medical History:   Diagnosis Date   • Asthma    • Hyperlipidemia    • Hypertension      No past surgical history on file.  Family History   Problem Relation Age of Onset   • Heart Disease Father    • Hypertension Father      Social History     Social History   • Marital status:      Spouse name: N/A   • Number of children: N/A   • Years of education: N/A     Occupational History   • Not on file.     Social History Main Topics   • Smoking status: Former Smoker     Types: Cigarettes     Quit date: 8/19/2012   • Smokeless tobacco: Never Used   • Alcohol use 1.0 oz/week     2 Cans of beer per week      Comment: 1-2 beers/couple times a week   • Drug use: No      Comment: Past   • Sexual activity: Yes     Partners: Female     Other Topics Concern   • Not on file     Social History Narrative   • No narrative on file       Objective:     Vitals: /72   Pulse 72   Temp 36.7 °C (98 °F)   Resp 14   Ht 1.651 m (5' 5\")   Wt 73.5 kg (162 lb)   SpO2 98%   BMI 26.96 kg/m²   General: Alert, pleasant, NAD  HEENT: Normocephalic.  =  Psych:  Affect/mood is normal, judgement is good, memory is intact, grooming is appropriate.    PHQ9: 1, somewhat difficult  GAD7: 5, somewhat difficult    Assessment/Plan:     Diagnoses and all orders for this visit:    Essential hypertension  Stable, continue current medications. Follow-up in 5 months for a med check. Will order labs at that point    Anxiety  Stress at home  Adjustment insomnia  Stable, continue BuSpar and trazodone. We discussed increasing the buspirone if needed, the patient feels quite comfortable at the current dose. Encouraged to continue counseling. Follow up in 5 months.    Hyperlipidemia, unspecified hyperlipidemia type  Stable, only on fish oil. We will repeat lipid panel in 5 months    Vitamin D deficiency  Stable, will repeat vitamin D level in 5 months     Need for vaccination  -     " PNEUMOCOCCAL POLYSACCHARIDE VACCINE 23-VALENT =>3YO SQ/IM        Return in about 5 months (around 2/1/2018) for Med check.

## 2017-09-15 ENCOUNTER — OFFICE VISIT (OUTPATIENT)
Dept: MEDICAL GROUP | Facility: MEDICAL CENTER | Age: 52
End: 2017-09-15
Payer: COMMERCIAL

## 2017-09-15 VITALS
HEIGHT: 65 IN | SYSTOLIC BLOOD PRESSURE: 122 MMHG | WEIGHT: 159 LBS | OXYGEN SATURATION: 98 % | RESPIRATION RATE: 14 BRPM | TEMPERATURE: 96.6 F | HEART RATE: 72 BPM | BODY MASS INDEX: 26.49 KG/M2 | DIASTOLIC BLOOD PRESSURE: 72 MMHG

## 2017-09-15 DIAGNOSIS — E78.5 HYPERLIPIDEMIA, UNSPECIFIED HYPERLIPIDEMIA TYPE: ICD-10-CM

## 2017-09-15 DIAGNOSIS — E55.9 VITAMIN D DEFICIENCY: ICD-10-CM

## 2017-09-15 DIAGNOSIS — F43.9 STRESS AT HOME: ICD-10-CM

## 2017-09-15 DIAGNOSIS — I10 ESSENTIAL HYPERTENSION: ICD-10-CM

## 2017-09-15 DIAGNOSIS — Z11.4 ENCOUNTER FOR SCREENING FOR HIV: ICD-10-CM

## 2017-09-15 DIAGNOSIS — F51.02 ADJUSTMENT INSOMNIA: ICD-10-CM

## 2017-09-15 DIAGNOSIS — Z11.3 ROUTINE SCREENING FOR STI (SEXUALLY TRANSMITTED INFECTION): ICD-10-CM

## 2017-09-15 DIAGNOSIS — F41.9 ANXIETY: ICD-10-CM

## 2017-09-15 DIAGNOSIS — N50.89 GENITAL LESION, MALE: ICD-10-CM

## 2017-09-15 PROCEDURE — 99214 OFFICE O/P EST MOD 30 MIN: CPT | Performed by: FAMILY MEDICINE

## 2017-09-15 RX ORDER — BUSPIRONE HYDROCHLORIDE 10 MG/1
20 TABLET ORAL 2 TIMES DAILY
Qty: 360 TAB | Refills: 1 | Status: SHIPPED | OUTPATIENT
Start: 2017-09-15 | End: 2017-12-15

## 2017-09-15 NOTE — PROGRESS NOTES
cc:  Multiple issues    Subjective:     Sathya Chan is a 52 y.o. male presentingTo discuss multiple issues:    1. Genital lesion: Has noticed a bump in the left groin approximately one week ago. Appears to be the same. Appears to be an ingrown hair, but is asymptomatic. Denies any pain, itchiness, swelling, fevers, joint pain.    2. Anxiety: He has noticed increasing anxiety over the past 2 weeks. He and his wife have decided to get a divorce. He is quite stressed over this and  continues to feel quite sad over this. His kids are supportive. He continues to go to counseling, which has been quite helpful. He is wondering if he can increase his anxiety medication.    3. Erection: He woke up this morning with an erection that persisted for almost 2 hours. This is quite unusual for him, is not sure if he should be concerned. Denies any pain, swelling, urinary symptoms, blood in the urine, skin changes. He has not taken Viagra in many months. Denies any other supplements or new foods. He did take 1-1/2 tablets of the trazodone the night before.      Review of systems:  See above.       Current Outpatient Prescriptions:   •  busPIRone (BUSPAR) 10 MG Tab, Take 2 Tabs by mouth 2 times a day., Disp: 360 Tab, Rfl: 1  •  Probiotic Product (PROBIOTIC DAILY PO), Take  by mouth., Disp: , Rfl:   •  STEVAN ROOT PO, Take  by mouth., Disp: , Rfl:   •  trazodone (DESYREL) 100 MG Tab, Take 1.5 Tabs by mouth at bedtime as needed for Sleep., Disp: , Rfl:   •  lisinopril-hydrochlorothiazide (PRINZIDE, ZESTORETIC) 20-25 MG per tablet, TAKE 1 TAB BY MOUTH EVERY DAY., Disp: 90 Tab, Rfl: 0  •  Omega-3 Fatty Acids (FISH OIL) 1000 MG Cap capsule, Take 1,000 mg by mouth 3 times a day, with meals., Disp: , Rfl:   •  Multiple Vitamins-Minerals (MULTIVITAMIN PO), Take  by mouth., Disp: , Rfl:     No Known Allergies    Past Medical History:   Diagnosis Date   • Asthma    • Hyperlipidemia    • Hypertension      No past surgical history on  "file.  Family History   Problem Relation Age of Onset   • Heart Disease Father    • Hypertension Father      Social History     Social History   • Marital status:      Spouse name: N/A   • Number of children: N/A   • Years of education: N/A     Occupational History   • Not on file.     Social History Main Topics   • Smoking status: Former Smoker     Types: Cigarettes     Quit date: 8/19/2012   • Smokeless tobacco: Never Used   • Alcohol use 1.0 oz/week     2 Cans of beer per week      Comment: 1-2 beers/couple times a week   • Drug use: No      Comment: Past   • Sexual activity: Yes     Partners: Female     Other Topics Concern   • Not on file     Social History Narrative   • No narrative on file       Objective:     Vitals: /72   Pulse 72   Temp 35.9 °C (96.6 °F)   Resp 14   Ht 1.651 m (5' 5\")   Wt 72.1 kg (159 lb)   SpO2 98%   BMI 26.46 kg/m²   General: Alert, pleasant, NAD  HEENT: Normocephalic.   Skin: Warm, dry.  Approximately 3 mm in diameter erythematous papule with a central eschar located on his left testicle. Nontender, no pus, blood, discharge.  Psych:  Affect/mood is normal, judgement is good, memory is intact, grooming is appropriate.    Phq9: 12, somewhat difficult  gad7: 15, somewhat difficult    Assessment/Plan:     Sathya was seen today for anxiety.    Diagnoses and all orders for this visit:    Essential hypertension  Stable, continue current medications. Will be due for lab  -     COMP METABOLIC PANEL; Future    Hyperlipidemia, unspecified hyperlipidemia type  Stable, diet controlled. Will repeat lipid panel  -     LIPID PROFILE; Future    Vitamin D deficiency  Stable, continue multivitamin  -     VITAMIN D,25 HYDROXY; Future    Anxiety  Adjustment insomnia  Stress at home  Offered support, recommended continue with the counseling. Will increase the BuSpar to 20 mg twice a day. He can increase to 30 mg twice a day after 2 weeks if he feels no improvement. Discussed possible side " effects of the trazodone related to his prolonged erection, is unclear at this point. Recommended monitoring for now, continue trazodone if he needs it.  -     busPIRone (BUSPAR) 10 MG Tab; Take 2 Tabs by mouth 2 times a day.    Genital lesion, male  Appears to be an ingrown hair follicle, but syphilis, herpes is in the differential. Will check the labs    Routine screening for STI (sexually transmitted infection)  -     HSV I/II IGG & IGM SERUM; Future  -     RPR  -     CHLAMYDIA/GC PCR URINE OR SWAB; Future    Encounter for screening for HIV  -     HIV ANTIBODIES; Future        Return in about 3 months (around 12/15/2017) for Med check.

## 2017-09-16 ENCOUNTER — HOSPITAL ENCOUNTER (OUTPATIENT)
Dept: LAB | Facility: MEDICAL CENTER | Age: 52
End: 2017-09-16
Attending: FAMILY MEDICINE
Payer: COMMERCIAL

## 2017-09-16 DIAGNOSIS — I10 ESSENTIAL HYPERTENSION: ICD-10-CM

## 2017-09-16 DIAGNOSIS — E78.5 HYPERLIPIDEMIA, UNSPECIFIED HYPERLIPIDEMIA TYPE: ICD-10-CM

## 2017-09-16 DIAGNOSIS — Z11.3 ROUTINE SCREENING FOR STI (SEXUALLY TRANSMITTED INFECTION): ICD-10-CM

## 2017-09-16 DIAGNOSIS — E55.9 VITAMIN D DEFICIENCY: ICD-10-CM

## 2017-09-16 DIAGNOSIS — Z11.4 ENCOUNTER FOR SCREENING FOR HIV: ICD-10-CM

## 2017-09-16 LAB
25(OH)D3 SERPL-MCNC: 36 NG/ML (ref 30–100)
ALBUMIN SERPL BCP-MCNC: 4.4 G/DL (ref 3.2–4.9)
ALBUMIN/GLOB SERPL: 1.2 G/DL
ALP SERPL-CCNC: 107 U/L (ref 30–99)
ALT SERPL-CCNC: 60 U/L (ref 2–50)
ANION GAP SERPL CALC-SCNC: 8 MMOL/L (ref 0–11.9)
AST SERPL-CCNC: 41 U/L (ref 12–45)
BILIRUB SERPL-MCNC: 0.7 MG/DL (ref 0.1–1.5)
BUN SERPL-MCNC: 13 MG/DL (ref 8–22)
CALCIUM SERPL-MCNC: 10 MG/DL (ref 8.5–10.5)
CHLORIDE SERPL-SCNC: 100 MMOL/L (ref 96–112)
CHOLEST SERPL-MCNC: 160 MG/DL (ref 100–199)
CO2 SERPL-SCNC: 30 MMOL/L (ref 20–33)
CREAT SERPL-MCNC: 0.86 MG/DL (ref 0.5–1.4)
GFR SERPL CREATININE-BSD FRML MDRD: >60 ML/MIN/1.73 M 2
GLOBULIN SER CALC-MCNC: 3.6 G/DL (ref 1.9–3.5)
GLUCOSE SERPL-MCNC: 102 MG/DL (ref 65–99)
HDLC SERPL-MCNC: 70 MG/DL
HIV 1+2 AB+HIV1 P24 AG SERPL QL IA: NON REACTIVE
LDLC SERPL CALC-MCNC: 74 MG/DL
POTASSIUM SERPL-SCNC: 3.6 MMOL/L (ref 3.6–5.5)
PROT SERPL-MCNC: 8 G/DL (ref 6–8.2)
SODIUM SERPL-SCNC: 138 MMOL/L (ref 135–145)
TREPONEMA PALLIDUM IGG+IGM AB [PRESENCE] IN SERUM OR PLASMA BY IMMUNOASSAY: NON REACTIVE
TRIGL SERPL-MCNC: 78 MG/DL (ref 0–149)

## 2017-09-16 PROCEDURE — 87389 HIV-1 AG W/HIV-1&-2 AB AG IA: CPT

## 2017-09-16 PROCEDURE — 36415 COLL VENOUS BLD VENIPUNCTURE: CPT

## 2017-09-16 PROCEDURE — 87491 CHLMYD TRACH DNA AMP PROBE: CPT

## 2017-09-16 PROCEDURE — 82306 VITAMIN D 25 HYDROXY: CPT

## 2017-09-16 PROCEDURE — 87591 N.GONORRHOEAE DNA AMP PROB: CPT

## 2017-09-16 PROCEDURE — 80061 LIPID PANEL: CPT

## 2017-09-16 PROCEDURE — 86694 HERPES SIMPLEX NES ANTBDY: CPT | Mod: 91

## 2017-09-16 PROCEDURE — 80053 COMPREHEN METABOLIC PANEL: CPT

## 2017-09-16 PROCEDURE — 86780 TREPONEMA PALLIDUM: CPT

## 2017-09-17 LAB
C TRACH DNA SPEC QL NAA+PROBE: NEGATIVE
N GONORRHOEA DNA SPEC QL NAA+PROBE: NEGATIVE
SPECIMEN SOURCE: NORMAL

## 2017-09-19 LAB
HSV1+2 IGG SER IA-ACNC: 0.32 IV
HSV1+2 IGM SER IA-ACNC: 0.3 IV

## 2017-10-03 RX ORDER — LISINOPRIL AND HYDROCHLOROTHIAZIDE 25; 20 MG/1; MG/1
TABLET ORAL
Refills: 0 | OUTPATIENT
Start: 2017-10-03

## 2017-10-03 RX ORDER — LISINOPRIL AND HYDROCHLOROTHIAZIDE 25; 20 MG/1; MG/1
1 TABLET ORAL
Qty: 90 TAB | Refills: 3 | Status: SHIPPED | OUTPATIENT
Start: 2017-10-03 | End: 2018-09-20 | Stop reason: SDUPTHER

## 2017-12-15 ENCOUNTER — OFFICE VISIT (OUTPATIENT)
Dept: MEDICAL GROUP | Facility: MEDICAL CENTER | Age: 52
End: 2017-12-15
Payer: COMMERCIAL

## 2017-12-15 VITALS
OXYGEN SATURATION: 97 % | TEMPERATURE: 98.6 F | SYSTOLIC BLOOD PRESSURE: 112 MMHG | HEIGHT: 65 IN | HEART RATE: 74 BPM | WEIGHT: 165 LBS | BODY MASS INDEX: 27.49 KG/M2 | DIASTOLIC BLOOD PRESSURE: 76 MMHG

## 2017-12-15 DIAGNOSIS — Z30.09 FAMILY PLANNING: ICD-10-CM

## 2017-12-15 DIAGNOSIS — Z30.09 VASECTOMY EVALUATION: ICD-10-CM

## 2017-12-15 DIAGNOSIS — F43.9 STRESS AT HOME: ICD-10-CM

## 2017-12-15 PROBLEM — F41.9 ANXIETY: Status: RESOLVED | Noted: 2017-08-01 | Resolved: 2017-12-15

## 2017-12-15 PROBLEM — G47.00 INSOMNIA: Status: RESOLVED | Noted: 2017-07-19 | Resolved: 2017-12-15

## 2017-12-15 PROCEDURE — 99213 OFFICE O/P EST LOW 20 MIN: CPT | Performed by: FAMILY MEDICINE

## 2017-12-15 ASSESSMENT — PATIENT HEALTH QUESTIONNAIRE - PHQ9: CLINICAL INTERPRETATION OF PHQ2 SCORE: 0

## 2017-12-15 NOTE — PROGRESS NOTES
cc: Anxiety    Subjective:     Sathya Chan is a 52 y.o. male presentingFor follow-up:    1. Anxiety: He reports that the stress at home and his marital stress has improved significantly. He has weaned off the buspirone with no issues. He has also stopped the trazodone as he is sleeping better and did not like the side effects. He denies any depression, anxiety, poor sleep, suicidal thoughts. He also wonders if he felt suicidal, BuSpar once or twice. Since stopping the buspirone, he has not felt suicidal. He continues to do counseling periodically. His last visit was last week, his next visit is not yet scheduled.    2. Vasectomy: He has an appointment with urologist to consider getting a vasectomy, needs a referral      Review of systems:  See above.       Current Outpatient Prescriptions:   •  lisinopril-hydrochlorothiazide (PRINZIDE, ZESTORETIC) 20-25 MG per tablet, Take 1 Tab by mouth every day., Disp: 90 Tab, Rfl: 3  •  Probiotic Product (PROBIOTIC DAILY PO), Take  by mouth., Disp: , Rfl:   •  Omega-3 Fatty Acids (FISH OIL) 1000 MG Cap capsule, Take 1,000 mg by mouth 3 times a day, with meals., Disp: , Rfl:   •  Multiple Vitamins-Minerals (MULTIVITAMIN PO), Take  by mouth., Disp: , Rfl:     No Known Allergies    Past Medical History:   Diagnosis Date   • Asthma    • Hyperlipidemia    • Hypertension      History reviewed. No pertinent surgical history.  Family History   Problem Relation Age of Onset   • Heart Disease Father    • Hypertension Father      Social History     Social History   • Marital status:      Spouse name: N/A   • Number of children: N/A   • Years of education: N/A     Occupational History   • Not on file.     Social History Main Topics   • Smoking status: Former Smoker     Types: Cigarettes     Quit date: 8/19/2012   • Smokeless tobacco: Never Used   • Alcohol use 1.0 oz/week     2 Cans of beer per week      Comment: 1-2 beers/couple times a week   • Drug use: No      Comment: Past  "  • Sexual activity: Yes     Partners: Female     Other Topics Concern   • Not on file     Social History Narrative   • No narrative on file       Objective:     Vitals: /76   Pulse 74   Temp 37 °C (98.6 °F)   Ht 1.651 m (5' 5\")   Wt 74.8 kg (165 lb)   SpO2 97%   BMI 27.46 kg/m²   General: Alert, pleasant, NAD  HEENT: Normocephalic.    Psych:  Affect/mood is normal, judgement is good, memory is intact, grooming is appropriate.    Assessment/Plan:     Sathya was seen today for hypertension.    Diagnoses and all orders for this visit:    Family planning  Vasectomy evaluation  -     REFERRAL TO UROLOGY    Stress at home  Seems to be improving, is off buspirone and trazodone. Recommended he continue the counseling as needed. Follow-up in 6 months      Return in about 6 months (around 6/15/2018).  "

## 2018-06-13 ENCOUNTER — TELEPHONE (OUTPATIENT)
Dept: MEDICAL GROUP | Facility: MEDICAL CENTER | Age: 53
End: 2018-06-13

## 2018-06-13 NOTE — TELEPHONE ENCOUNTER
1. Caller Name: livan                                         Call Back Number: [phone] (home)         Patient approves a detailed voicemail message: yes    patient would like to know if he needs labs before appt

## 2018-06-15 ENCOUNTER — HOSPITAL ENCOUNTER (OUTPATIENT)
Facility: MEDICAL CENTER | Age: 53
End: 2018-06-15
Attending: FAMILY MEDICINE
Payer: COMMERCIAL

## 2018-06-15 ENCOUNTER — OFFICE VISIT (OUTPATIENT)
Dept: MEDICAL GROUP | Facility: MEDICAL CENTER | Age: 53
End: 2018-06-15
Payer: COMMERCIAL

## 2018-06-15 VITALS
SYSTOLIC BLOOD PRESSURE: 124 MMHG | WEIGHT: 177 LBS | HEIGHT: 65 IN | DIASTOLIC BLOOD PRESSURE: 78 MMHG | BODY MASS INDEX: 29.49 KG/M2 | OXYGEN SATURATION: 96 % | HEART RATE: 104 BPM | TEMPERATURE: 98.6 F

## 2018-06-15 DIAGNOSIS — Z11.4 ENCOUNTER FOR SCREENING FOR HIV: ICD-10-CM

## 2018-06-15 DIAGNOSIS — E78.5 HYPERLIPIDEMIA, UNSPECIFIED HYPERLIPIDEMIA TYPE: ICD-10-CM

## 2018-06-15 DIAGNOSIS — E55.9 VITAMIN D DEFICIENCY: ICD-10-CM

## 2018-06-15 DIAGNOSIS — I10 ESSENTIAL HYPERTENSION: ICD-10-CM

## 2018-06-15 DIAGNOSIS — Z11.3 ROUTINE SCREENING FOR STI (SEXUALLY TRANSMITTED INFECTION): ICD-10-CM

## 2018-06-15 DIAGNOSIS — Z11.59 NEED FOR HEPATITIS C SCREENING TEST: ICD-10-CM

## 2018-06-15 PROCEDURE — 99214 OFFICE O/P EST MOD 30 MIN: CPT | Performed by: FAMILY MEDICINE

## 2018-06-15 PROCEDURE — 87491 CHLMYD TRACH DNA AMP PROBE: CPT

## 2018-06-15 PROCEDURE — 87591 N.GONORRHOEAE DNA AMP PROB: CPT

## 2018-06-15 NOTE — PROGRESS NOTES
"cc:  Blood pressure    Subjective:     Sathya Chan is a 52 y.o. male presenting for:      1. HTN: Has hx of arterial hypertension.  Is on lisinopril-hydrochlorothiazide 20-25 mg daily.  Denies any chest pain, shortness of breath, leg swelling, jaw claudication, lightheadedness, dizziness. Last CMP was 9/2017. Last lipid panel was 9/2017. He hasn't been eating healthy, not exercising regularly. He has gained about 10 pounds since his last visit.     2. Vitamin D deficiency: History of decreased levels. Last level in 9/2018 was normal. Takes multivitamin    3. STD screening: He had a new sexual partner several months ago, would like to get checked. Denies any symptoms    Review of systems:  See above.       Current Outpatient Prescriptions:   •  lisinopril-hydrochlorothiazide (PRINZIDE, ZESTORETIC) 20-25 MG per tablet, Take 1 Tab by mouth every day., Disp: 90 Tab, Rfl: 3  •  Probiotic Product (PROBIOTIC DAILY PO), Take  by mouth., Disp: , Rfl:   •  Omega-3 Fatty Acids (FISH OIL) 1000 MG Cap capsule, Take 1,000 mg by mouth 3 times a day, with meals., Disp: , Rfl:   •  Multiple Vitamins-Minerals (MULTIVITAMIN PO), Take  by mouth., Disp: , Rfl:     Allergies, past medical history, past surgical history, family history, social history reviewed and updated    Objective:     Vitals: /78   Pulse (!) 104   Temp 37 °C (98.6 °F)   Ht 1.651 m (5' 5\")   Wt 80.3 kg (177 lb)   SpO2 96%   BMI 29.45 kg/m²   General: Alert, pleasant, NAD  HEENT: Normocephalic.  PERRL, EOMI, no icterus or pallor.  Conjunctivae and lids normal. External ears normal.  Heart: Regular rate and rhythm.  S1 and S2 normal.  No murmurs appreciated.  Respiratory: Normal respiratory effort.  Clear to auscultation bilaterally.  Abdomen: Non-distended, soft  Skin: Warm, dry, no rashes.  Musculoskeletal: Gait is normal.  Moves all extremities well.  Extremities: No leg edema.   Psych:  Affect/mood is normal, judgement is good, memory is intact, " grooming is appropriate.    Assessment/Plan:     Sathya was seen today for hypertension.    Diagnoses and all orders for this visit:    Essential hypertension  stable, continue current medications, is due for repeat labs. Follow-up in 6 months  -     CBC WITHOUT DIFFERENTIAL; Future  -     COMP METABOLIC PANEL; Future  -     TSH WITH REFLEX TO FT4; Future    Hyperlipidemia, unspecified hyperlipidemia type  Stable.  discussed healthy diet, regular exercise. We'll recheck a lipid panel and calculate his ASCVD risk  -     LIPID PROFILE; Future    Vitamin D deficiency  Stable, continue supplementation  -     VITAMIN D,25 HYDROXY; Future    Routine screening for STI (sexually transmitted infection)  -     CHLAMYDIA/GC PCR URINE OR SWAB; Future    Need for hepatitis C screening test  -     HEP C VIRUS ANTIBODY; Future    Encounter for screening for HIV  -     HIV AG/AB COMBO ASSAY SCREENING; Future        Return in about 6 months (around 12/15/2018) for routine follow up.

## 2018-09-20 RX ORDER — LISINOPRIL AND HYDROCHLOROTHIAZIDE 25; 20 MG/1; MG/1
1 TABLET ORAL
Qty: 90 TAB | Refills: 3 | Status: SHIPPED | OUTPATIENT
Start: 2018-09-20 | End: 2018-11-06

## 2018-11-06 ENCOUNTER — OFFICE VISIT (OUTPATIENT)
Dept: MEDICAL GROUP | Facility: MEDICAL CENTER | Age: 53
End: 2018-11-06
Payer: COMMERCIAL

## 2018-11-06 ENCOUNTER — HOSPITAL ENCOUNTER (OUTPATIENT)
Dept: RADIOLOGY | Facility: MEDICAL CENTER | Age: 53
End: 2018-11-06
Attending: FAMILY MEDICINE
Payer: COMMERCIAL

## 2018-11-06 VITALS
HEART RATE: 78 BPM | BODY MASS INDEX: 29.82 KG/M2 | OXYGEN SATURATION: 95 % | DIASTOLIC BLOOD PRESSURE: 74 MMHG | HEIGHT: 65 IN | SYSTOLIC BLOOD PRESSURE: 124 MMHG | WEIGHT: 179 LBS | RESPIRATION RATE: 16 BRPM | TEMPERATURE: 98.6 F

## 2018-11-06 DIAGNOSIS — R05.3 CHRONIC COUGH: ICD-10-CM

## 2018-11-06 DIAGNOSIS — I10 ESSENTIAL HYPERTENSION: ICD-10-CM

## 2018-11-06 PROCEDURE — 71046 X-RAY EXAM CHEST 2 VIEWS: CPT

## 2018-11-06 PROCEDURE — 99214 OFFICE O/P EST MOD 30 MIN: CPT | Performed by: FAMILY MEDICINE

## 2018-11-06 RX ORDER — LOSARTAN POTASSIUM AND HYDROCHLOROTHIAZIDE 25; 100 MG/1; MG/1
1 TABLET ORAL DAILY
Qty: 90 TAB | Refills: 1 | Status: SHIPPED | OUTPATIENT
Start: 2018-11-06 | End: 2019-04-26 | Stop reason: SDUPTHER

## 2018-11-06 ASSESSMENT — PATIENT HEALTH QUESTIONNAIRE - PHQ9: CLINICAL INTERPRETATION OF PHQ2 SCORE: 0

## 2018-11-06 NOTE — PROGRESS NOTES
"cc: Cough    Subjective:     Sathya Chan is a 53 y.o. male presenting to discuss a cough.  In early September, he had an upper respiratory infection with a sore throat, fatigue, cough.  Symptoms overall improved, but the cough continues to persist.  Has been going on for over 2 months now.  Describes a dry, intermittent cough.  Triggers include smoke environments or being in his job where the air is not clear.  He tried albuterol, seemed to make it worse.  Denies any nausea, vomiting, headaches, ear pain, nasal congestion, sore throat, shortness of breath, chest pain, abdominal pain, diarrhea, constipation, rashes, recent travel, sick contacts.  He denies having seasonal allergies.  Denies heartburn symptoms as well.  He does not cough when he sleeps or lays down.    Review of systems:  See above.       Current Outpatient Prescriptions:   •  losartan-hydrochlorothiazide (HYZAAR) 100-25 MG per tablet, Take 1 Tab by mouth every day., Disp: 90 Tab, Rfl: 1  •  Probiotic Product (PROBIOTIC DAILY PO), Take  by mouth., Disp: , Rfl:   •  Omega-3 Fatty Acids (FISH OIL) 1000 MG Cap capsule, Take 1,000 mg by mouth 3 times a day, with meals., Disp: , Rfl:   •  Multiple Vitamins-Minerals (MULTIVITAMIN PO), Take  by mouth., Disp: , Rfl:     Allergies, past medical history, past surgical history, family history, social history reviewed and updated    Objective:     Vitals: /74 (BP Location: Right arm, Patient Position: Sitting)   Pulse 78   Temp 37 °C (98.6 °F) (Temporal)   Resp 16   Ht 1.651 m (5' 5\")   Wt 81.2 kg (179 lb)   SpO2 95%   BMI 29.79 kg/m²   General: Alert, pleasant, NAD  HEENT: Normocephalic.  EOMI, no icterus or pallor.  Conjunctivae and lids normal. External ears normal. Nares patent, mucosa pink, drainage present. Oropharynx non-erythematous, mucous membranes moist.  Neck supple.  No thyromegaly or masses palpated. No cervical or supraclavicular lymphadenopathy.  Heart: Regular rate and rhythm.  " S1 and S2 normal.  No murmurs appreciated.  Respiratory: Normal respiratory effort.  Clear to auscultation bilaterally.  Abdomen: Non-distended, soft  Skin: Warm, dry, no rashes.  Musculoskeletal: Gait is normal.  Moves all extremities well.  Extremities: No leg edema.    Psych:  Affect/mood is normal, judgement is good, memory is intact, grooming is appropriate.    Assessment/Plan:     Sathya was seen today for cough.    Diagnoses and all orders for this visit:    Chronic cough  Discussed various possibilities including upper airway cough syndrome, GERD, nasal congestion, lisinopril.  We will check a chest x-ray to rule out any other infectious issues.  But suspect his cough may be due to lisinopril.  We will switch his blood pressure medicine to losartan-hydrochlorothiazide.  He will do his labs, which have already been ordered, 3 days after he makes the switch.  F/u in 6 weeks.  -     DX-CHEST-2 VIEWS; Future    Essential hypertension  Unstable, will switch to losartan-hydrochlorothiazide given his cough.  F/u in 6 weeks.  -     losartan-hydrochlorothiazide (HYZAAR) 100-25 MG per tablet; Take 1 Tab by mouth every day.        Return in about 6 weeks (around 12/18/2018) for Med check.

## 2018-12-15 ENCOUNTER — HOSPITAL ENCOUNTER (OUTPATIENT)
Dept: LAB | Facility: MEDICAL CENTER | Age: 53
End: 2018-12-15
Attending: FAMILY MEDICINE
Payer: COMMERCIAL

## 2018-12-15 DIAGNOSIS — I10 ESSENTIAL HYPERTENSION: ICD-10-CM

## 2018-12-15 DIAGNOSIS — Z11.4 ENCOUNTER FOR SCREENING FOR HIV: ICD-10-CM

## 2018-12-15 DIAGNOSIS — E78.5 HYPERLIPIDEMIA, UNSPECIFIED HYPERLIPIDEMIA TYPE: ICD-10-CM

## 2018-12-15 DIAGNOSIS — Z11.59 NEED FOR HEPATITIS C SCREENING TEST: ICD-10-CM

## 2018-12-15 DIAGNOSIS — E55.9 VITAMIN D DEFICIENCY: ICD-10-CM

## 2018-12-15 LAB
ALBUMIN SERPL BCP-MCNC: 4.6 G/DL (ref 3.2–4.9)
ALBUMIN/GLOB SERPL: 1.8 G/DL
ALP SERPL-CCNC: 74 U/L (ref 30–99)
ALT SERPL-CCNC: 64 U/L (ref 2–50)
ANION GAP SERPL CALC-SCNC: 7 MMOL/L (ref 0–11.9)
AST SERPL-CCNC: 43 U/L (ref 12–45)
BILIRUB SERPL-MCNC: 0.4 MG/DL (ref 0.1–1.5)
BUN SERPL-MCNC: 14 MG/DL (ref 8–22)
CALCIUM SERPL-MCNC: 10.1 MG/DL (ref 8.5–10.5)
CHLORIDE SERPL-SCNC: 105 MMOL/L (ref 96–112)
CHOLEST SERPL-MCNC: 222 MG/DL (ref 100–199)
CO2 SERPL-SCNC: 27 MMOL/L (ref 20–33)
CREAT SERPL-MCNC: 0.9 MG/DL (ref 0.5–1.4)
FASTING STATUS PATIENT QL REPORTED: NORMAL
GLOBULIN SER CALC-MCNC: 2.5 G/DL (ref 1.9–3.5)
GLUCOSE SERPL-MCNC: 95 MG/DL (ref 65–99)
HDLC SERPL-MCNC: 55 MG/DL
LDLC SERPL CALC-MCNC: 142 MG/DL
POTASSIUM SERPL-SCNC: 4.2 MMOL/L (ref 3.6–5.5)
PROT SERPL-MCNC: 7.1 G/DL (ref 6–8.2)
SODIUM SERPL-SCNC: 139 MMOL/L (ref 135–145)
TRIGL SERPL-MCNC: 127 MG/DL (ref 0–149)

## 2018-12-15 PROCEDURE — 86803 HEPATITIS C AB TEST: CPT

## 2018-12-15 PROCEDURE — 36415 COLL VENOUS BLD VENIPUNCTURE: CPT

## 2018-12-15 PROCEDURE — 80053 COMPREHEN METABOLIC PANEL: CPT

## 2018-12-15 PROCEDURE — 84443 ASSAY THYROID STIM HORMONE: CPT

## 2018-12-15 PROCEDURE — 87389 HIV-1 AG W/HIV-1&-2 AB AG IA: CPT

## 2018-12-15 PROCEDURE — 80061 LIPID PANEL: CPT

## 2018-12-15 PROCEDURE — 82306 VITAMIN D 25 HYDROXY: CPT

## 2018-12-16 LAB
25(OH)D3 SERPL-MCNC: 29 NG/ML (ref 30–100)
HCV AB SER QL: NEGATIVE
HIV 1+2 AB+HIV1 P24 AG SERPL QL IA: NON REACTIVE
TSH SERPL DL<=0.005 MIU/L-ACNC: 2.1 UIU/ML (ref 0.38–5.33)

## 2018-12-17 ENCOUNTER — OFFICE VISIT (OUTPATIENT)
Dept: MEDICAL GROUP | Facility: MEDICAL CENTER | Age: 53
End: 2018-12-17
Payer: COMMERCIAL

## 2018-12-17 VITALS
HEIGHT: 65 IN | HEART RATE: 74 BPM | TEMPERATURE: 98.6 F | RESPIRATION RATE: 16 BRPM | BODY MASS INDEX: 31.16 KG/M2 | OXYGEN SATURATION: 97 % | WEIGHT: 187 LBS | DIASTOLIC BLOOD PRESSURE: 76 MMHG | SYSTOLIC BLOOD PRESSURE: 122 MMHG

## 2018-12-17 DIAGNOSIS — I10 ESSENTIAL HYPERTENSION: ICD-10-CM

## 2018-12-17 DIAGNOSIS — E55.9 VITAMIN D DEFICIENCY: ICD-10-CM

## 2018-12-17 DIAGNOSIS — E78.5 HYPERLIPIDEMIA, UNSPECIFIED HYPERLIPIDEMIA TYPE: ICD-10-CM

## 2018-12-17 DIAGNOSIS — E66.9 OBESITY (BMI 30-39.9): ICD-10-CM

## 2018-12-17 PROBLEM — F43.9 STRESS AT HOME: Status: RESOLVED | Noted: 2017-08-01 | Resolved: 2018-12-17

## 2018-12-17 PROCEDURE — 99214 OFFICE O/P EST MOD 30 MIN: CPT | Performed by: FAMILY MEDICINE

## 2018-12-17 NOTE — LETTER
[employer]  Gabe Simon M.D.  [address]  [place] [address]  Fax: [phone]   Authorization for Release/Disclosure of   Protected Health Information   Name: RICARDA CHAN : 1965 SSN: [id number]   Address: [address] [place] [address]  [place] [address] Phone:    [phone] (home)    I authorize the entity listed below to release/disclose the PHI below to:   "[employer]" [facility]/Gabe Simon M.D. and Gabe Simon M.D.   Provider or Entity Name:   [facility]    Address   City, State, [facility]   Phone:      Fax:     Reason for request: continuity of care   Information to be released:    [x ] LAST COLONOSCOPY,  including any PATH REPORT and follow-up  [  ] LAST FIT/COLOGUARD RESULT [  ] LAST DEXA  [  ] LAST MAMMOGRAM  [  ] LAST PAP  [  ] LAST LABS [  ] RETINA EXAM REPORT  [  ] IMMUNIZATION RECORDS  [  ] Release all info      [  ] Check here and initial the line next to each item to release ALL health information INCLUDING  _____ Care and treatment for drug and / or alcohol abuse  _____ HIV testing, infection status, or AIDS  _____ Genetic Testing    DATES OF SERVICE OR TIME PERIOD TO BE DISCLOSED: _____________  I understand and acknowledge that:  * This Authorization may be revoked at any time by you in writing, except if your health information has already been used or disclosed.  * Your health information that will be used or disclosed as a result of you signing this authorization could be re-disclosed by the recipient. If this occurs, your re-disclosed health information may no longer be protected by State or Federal laws.  * You may refuse to sign this Authorization. Your refusal will not affect your ability to obtain treatment.  * This Authorization becomes effective upon signing and will  on (date) __________.      If no date is indicated, this Authorization will  one (1) year from the signature date.    Name: Ricarda Chan    Signature:   Date:          12/17/2018       PLEASE FAX REQUESTED RECORDS BACK TO: [phone]

## 2018-12-17 NOTE — PROGRESS NOTES
"cc: Blood pressure    Subjective:     Sahtya Chan is a 53 y.o. male presenting for:     1. HTN: Has hx of arterial hypertension, has been stable.  Is on losartan-hydrochlorothiazide 100-25 mg daily.  Denies any chest pain, shortness of breath, leg swelling, lightheadedness, dizziness. Last CMP was 12/2018.     2.  Hyperlipidemia: Has a history of elevated triglycerides and mildly elevated LDL.  He was able to improve this without any medications.  However, on his last lipid panel, his LDL and total cholesterol were somewhat elevated.  His ASCVD risk is around 5.9%.  He has gained about 10 pounds since his last visit.  He has not been watching his diet or exercising regularly.     3. Vitamin D deficiency: History of decreased levels. Last level in 12/2018 was mildly decreased. Takes multivitamin      Review of systems:  See above.       Current Outpatient Prescriptions:   •  losartan-hydrochlorothiazide (HYZAAR) 100-25 MG per tablet, Take 1 Tab by mouth every day., Disp: 90 Tab, Rfl: 1  •  Omega-3 Fatty Acids (FISH OIL) 1000 MG Cap capsule, Take 1,200 mg by mouth., Disp: , Rfl:   •  Multiple Vitamins-Minerals (MULTIVITAMIN PO), Take  by mouth., Disp: , Rfl:     Allergies, past medical history, past surgical history, family history, social history reviewed and updated    Objective:     Vitals: /76 (BP Location: Right arm, Patient Position: Sitting)   Pulse 74   Temp 37 °C (98.6 °F) (Temporal)   Resp 16   Ht 1.651 m (5' 5\")   Wt 84.8 kg (187 lb)   SpO2 97%   BMI 31.12 kg/m²   General: Alert, pleasant, NAD  HEENT: Normocephalic.  EOMI, no icterus or pallor.  Conjunctivae and lids normal. External ears normal. Oropharynx non-erythematous, mucous membranes moist.  Neck supple.  No thyromegaly or masses palpated. No cervical or supraclavicular lymphadenopathy.  Heart: Regular rate and rhythm.  S1 and S2 normal.  No murmurs appreciated.  Respiratory: Normal respiratory effort.  Clear to auscultation " bilaterally.  Abdomen: Non-distended, soft  Skin: Warm, dry, no rashes.  Musculoskeletal: Gait is normal.  Moves all extremities well.  Extremities: No leg edema.     Psych:  Affect/mood is normal, judgement is good, memory is intact, grooming is appropriate.    Assessment/Plan:     Diagnoses and all orders for this visit:    Essential hypertension  Stable, continue losartan-hydrochlorothiazide.  Follow-up in 6 months    Hyperlipidemia, unspecified hyperlipidemia type  Unstable.  Discussed healthy diet and exercise.  We will continue to monitor this, we will hold off on medications for now.  Follow-up in 6 months    Vitamin D deficiency  Stable, continue supplementation    Obesity (BMI 30-39.9)  Unstable.  Discussed healthy diet and exercise.  Offered referral to dietitian, he declined.      Return in about 6 months (around 6/17/2019) for Med check.

## 2019-04-28 RX ORDER — LOSARTAN POTASSIUM AND HYDROCHLOROTHIAZIDE 25; 100 MG/1; MG/1
1 TABLET ORAL DAILY
Qty: 90 TAB | Refills: 3 | Status: SHIPPED | OUTPATIENT
Start: 2019-04-28 | End: 2019-12-17

## 2019-06-10 ENCOUNTER — OFFICE VISIT (OUTPATIENT)
Dept: MEDICAL GROUP | Facility: MEDICAL CENTER | Age: 54
End: 2019-06-10
Payer: COMMERCIAL

## 2019-06-10 VITALS
SYSTOLIC BLOOD PRESSURE: 112 MMHG | BODY MASS INDEX: 31.65 KG/M2 | TEMPERATURE: 98.6 F | HEIGHT: 65 IN | RESPIRATION RATE: 16 BRPM | WEIGHT: 190 LBS | HEART RATE: 76 BPM | DIASTOLIC BLOOD PRESSURE: 68 MMHG | OXYGEN SATURATION: 96 %

## 2019-06-10 DIAGNOSIS — E66.9 OBESITY (BMI 30-39.9): ICD-10-CM

## 2019-06-10 DIAGNOSIS — E78.5 HYPERLIPIDEMIA, UNSPECIFIED HYPERLIPIDEMIA TYPE: ICD-10-CM

## 2019-06-10 DIAGNOSIS — I10 ESSENTIAL HYPERTENSION: ICD-10-CM

## 2019-06-10 PROCEDURE — 99213 OFFICE O/P EST LOW 20 MIN: CPT | Performed by: FAMILY MEDICINE

## 2019-06-10 ASSESSMENT — PATIENT HEALTH QUESTIONNAIRE - PHQ9: CLINICAL INTERPRETATION OF PHQ2 SCORE: 0

## 2019-06-10 NOTE — PROGRESS NOTES
cc:  HTN    Subjective:     Sahtya Chan is a 53 y.o. male presenting for:      1. Hypertension: has a hx of arterial hypertension, has been stable.  Is currently on losartan-hydrochlorothiazide 100-25 mg daily.  Denies any side effects.  Denies any chest pain, shortness of breath, lightheadedness, dizziness, headaches, leg swelling.  · BP today at goal: yes   · BPs at home: not checked  · Sodium intake: discussed   · DASH diet: discussed   · Physical activity: none, discussed   · Body mass index is 31.62 kg/m²., weight loss discussed   · Alcohol use: couple drinks a week at most   · Tobacco use: nonsmoker  The 10-year ASCVD risk score (Enma PETERS Jr., et al., 2013) is: 4.9%    Values used to calculate the score:      Age: 53 years      Sex: Male      Is Non- : No      Diabetic: No      Tobacco smoker: No      Systolic Blood Pressure: 112 mmHg      Is BP treated: Yes      HDL Cholesterol: 55 mg/dL  ·     Total Cholesterol: 222 mg/dL      · Last CBC: none  · Last eGFR: >60 on 12/2018  · Last fasting glucose or a1c: 12/2018  · Last lipid panel: 12/2018  · Last microalbumin: none   · Last EKG: none  · Last echocardiogram: none     2.  Hyperlipidemia: Has a history of elevated triglycerides and mildly elevated LDL.  He was able to improve this without any medications.  However, on his last lipid panel, his LDL and total cholesterol were somewhat elevated.  His ASCVD risk is around 5%.  He has gained another ~5 pounds since his last visit.  He has not been watching his diet or exercising regularly, however, is determined to do so     3. Vitamin D deficiency: History of decreased levels. Last level in 12/2018 was mildly decreased. Takes multivitamin      Review of systems:  See above.       Current Outpatient Prescriptions:   •  ST GUY WORT PO, Take  by mouth., Disp: , Rfl:   •  S-Adenosylmethionine (SAME PO), Take  by mouth., Disp: , Rfl:   •  TRYPTOPHAN PO, Take  by mouth., Disp: , Rfl:   •   "5-Hydroxytryptophan (5-HTP PO), Take  by mouth., Disp: , Rfl:   •  Omega-3 Fat Ac-Cholecalciferol (OMEGA ESSENTIALS/VIT D3 PO), Take  by mouth., Disp: , Rfl:   •  losartan-hydrochlorothiazide (HYZAAR) 100-25 MG per tablet, Take 1 Tab by mouth every day., Disp: 90 Tab, Rfl: 3  •  Multiple Vitamins-Minerals (MULTIVITAMIN PO), Take  by mouth., Disp: , Rfl:     Allergies, past medical history, past surgical history, family history, social history reviewed and updated    Objective:     Vitals: /68 (BP Location: Right arm, Patient Position: Sitting)   Pulse 76   Temp 37 °C (98.6 °F) (Temporal)   Resp 16   Ht 1.651 m (5' 5\")   Wt 86.2 kg (190 lb)   SpO2 96%   BMI 31.62 kg/m²   General: Alert, pleasant, NAD  HEENT: Normocephalic.  EOMI, no icterus or pallor.  Conjunctivae and lids normal. External ears normal. Oropharynx non-erythematous, mucous membranes moist.  Neck supple.  No thyromegaly or masses palpated. No cervical or supraclavicular lymphadenopathy.  Heart: Regular rate and rhythm.  S1 and S2 normal.  No murmurs appreciated.  Respiratory: Normal respiratory effort.  Clear to auscultation bilaterally.  Abdomen: Non-distended, soft  Skin: Warm, dry, no rashes.  Musculoskeletal: Gait is normal.  Moves all extremities well.  Extremities: No leg edema.  Psych:  Affect/mood is normal, judgement is good, memory is intact, grooming is appropriate.    Assessment/Plan:     Diagnoses and all orders for this visit:    Essential hypertension  Stable, continue current medications.  Discussed healthy diet and exercise.  Follow-up in 6 months.  -     CBC WITHOUT DIFFERENTIAL; Future  -     Comp Metabolic Panel; Future  -     Lipid Profile; Future  -     MICROALBUMIN CREAT RATIO URINE; Future    Hyperlipidemia, unspecified hyperlipidemia type  Elevated, patient declines starting a medication.  He would like to work on lifestyle modification first.  Follow-up in 6 months  -     Comp Metabolic Panel; Future  -     Lipid " Profile; Future    Obesity (BMI 30-39.9)  -     Patient identified as having weight management issue.  Appropriate orders and counseling given.      Return in about 6 months (around 12/10/2019) for routine follow up.

## 2019-06-15 ENCOUNTER — HOSPITAL ENCOUNTER (OUTPATIENT)
Dept: LAB | Facility: MEDICAL CENTER | Age: 54
End: 2019-06-15
Attending: FAMILY MEDICINE
Payer: COMMERCIAL

## 2019-06-15 DIAGNOSIS — I10 ESSENTIAL HYPERTENSION: ICD-10-CM

## 2019-06-15 DIAGNOSIS — E78.5 HYPERLIPIDEMIA, UNSPECIFIED HYPERLIPIDEMIA TYPE: ICD-10-CM

## 2019-06-15 LAB
ALBUMIN SERPL BCP-MCNC: 4.7 G/DL (ref 3.2–4.9)
ALBUMIN/GLOB SERPL: 1.9 G/DL
ALP SERPL-CCNC: 53 U/L (ref 30–99)
ALT SERPL-CCNC: 53 U/L (ref 2–50)
ANION GAP SERPL CALC-SCNC: 8 MMOL/L (ref 0–11.9)
AST SERPL-CCNC: 36 U/L (ref 12–45)
BILIRUB SERPL-MCNC: 0.9 MG/DL (ref 0.1–1.5)
BUN SERPL-MCNC: 15 MG/DL (ref 8–22)
CALCIUM SERPL-MCNC: 9.7 MG/DL (ref 8.5–10.5)
CHLORIDE SERPL-SCNC: 105 MMOL/L (ref 96–112)
CHOLEST SERPL-MCNC: 231 MG/DL (ref 100–199)
CO2 SERPL-SCNC: 27 MMOL/L (ref 20–33)
CREAT SERPL-MCNC: 0.93 MG/DL (ref 0.5–1.4)
CREAT UR-MCNC: 172.9 MG/DL
ERYTHROCYTE [DISTWIDTH] IN BLOOD BY AUTOMATED COUNT: 42.3 FL (ref 35.9–50)
FASTING STATUS PATIENT QL REPORTED: NORMAL
GLOBULIN SER CALC-MCNC: 2.5 G/DL (ref 1.9–3.5)
GLUCOSE SERPL-MCNC: 93 MG/DL (ref 65–99)
HCT VFR BLD AUTO: 46.9 % (ref 42–52)
HDLC SERPL-MCNC: 45 MG/DL
HGB BLD-MCNC: 16.1 G/DL (ref 14–18)
LDLC SERPL CALC-MCNC: 130 MG/DL
MCH RBC QN AUTO: 30.5 PG (ref 27–33)
MCHC RBC AUTO-ENTMCNC: 34.3 G/DL (ref 33.7–35.3)
MCV RBC AUTO: 88.8 FL (ref 81.4–97.8)
MICROALBUMIN UR-MCNC: <0.7 MG/DL
MICROALBUMIN/CREAT UR: NORMAL MG/G (ref 0–30)
PLATELET # BLD AUTO: 364 K/UL (ref 164–446)
PMV BLD AUTO: 9.1 FL (ref 9–12.9)
POTASSIUM SERPL-SCNC: 3.8 MMOL/L (ref 3.6–5.5)
PROT SERPL-MCNC: 7.2 G/DL (ref 6–8.2)
RBC # BLD AUTO: 5.28 M/UL (ref 4.7–6.1)
SODIUM SERPL-SCNC: 140 MMOL/L (ref 135–145)
TRIGL SERPL-MCNC: 278 MG/DL (ref 0–149)
WBC # BLD AUTO: 6.7 K/UL (ref 4.8–10.8)

## 2019-06-15 PROCEDURE — 80053 COMPREHEN METABOLIC PANEL: CPT

## 2019-06-15 PROCEDURE — 36415 COLL VENOUS BLD VENIPUNCTURE: CPT

## 2019-06-15 PROCEDURE — 85027 COMPLETE CBC AUTOMATED: CPT

## 2019-06-15 PROCEDURE — 82570 ASSAY OF URINE CREATININE: CPT

## 2019-06-15 PROCEDURE — 80061 LIPID PANEL: CPT

## 2019-06-15 PROCEDURE — 82043 UR ALBUMIN QUANTITATIVE: CPT

## 2019-09-20 ENCOUNTER — OFFICE VISIT (OUTPATIENT)
Dept: MEDICAL GROUP | Facility: MEDICAL CENTER | Age: 54
End: 2019-09-20
Payer: COMMERCIAL

## 2019-09-20 ENCOUNTER — HOSPITAL ENCOUNTER (OUTPATIENT)
Facility: MEDICAL CENTER | Age: 54
End: 2019-09-20
Attending: FAMILY MEDICINE
Payer: COMMERCIAL

## 2019-09-20 VITALS
BODY MASS INDEX: 31.15 KG/M2 | HEIGHT: 65 IN | OXYGEN SATURATION: 94 % | WEIGHT: 186.95 LBS | DIASTOLIC BLOOD PRESSURE: 70 MMHG | SYSTOLIC BLOOD PRESSURE: 118 MMHG | HEART RATE: 79 BPM | TEMPERATURE: 97.3 F

## 2019-09-20 DIAGNOSIS — R31.9 HEMATURIA, UNSPECIFIED TYPE: ICD-10-CM

## 2019-09-20 DIAGNOSIS — Z12.5 ENCOUNTER FOR SCREENING FOR MALIGNANT NEOPLASM OF PROSTATE: ICD-10-CM

## 2019-09-20 DIAGNOSIS — Z11.3 ROUTINE SCREENING FOR STI (SEXUALLY TRANSMITTED INFECTION): ICD-10-CM

## 2019-09-20 LAB
APPEARANCE UR: ABNORMAL
BILIRUB UR STRIP-MCNC: ABNORMAL MG/DL
COLOR UR AUTO: ABNORMAL
GLUCOSE UR STRIP.AUTO-MCNC: ABNORMAL MG/DL
KETONES UR STRIP.AUTO-MCNC: ABNORMAL MG/DL
LEUKOCYTE ESTERASE UR QL STRIP.AUTO: ABNORMAL
NITRITE UR QL STRIP.AUTO: ABNORMAL
PH UR STRIP.AUTO: 6.5 [PH] (ref 5–8)
PROT UR QL STRIP: 30 MG/DL
RBC UR QL AUTO: ABNORMAL
SP GR UR STRIP.AUTO: 1.01
UROBILINOGEN UR STRIP-MCNC: 0.2 MG/DL

## 2019-09-20 PROCEDURE — 87086 URINE CULTURE/COLONY COUNT: CPT

## 2019-09-20 PROCEDURE — 81002 URINALYSIS NONAUTO W/O SCOPE: CPT | Performed by: FAMILY MEDICINE

## 2019-09-20 PROCEDURE — 99214 OFFICE O/P EST MOD 30 MIN: CPT | Performed by: FAMILY MEDICINE

## 2019-09-20 SDOH — HEALTH STABILITY: MENTAL HEALTH: HOW OFTEN DO YOU HAVE A DRINK CONTAINING ALCOHOL?: 4 OR MORE TIMES A WEEK

## 2019-09-20 SDOH — HEALTH STABILITY: MENTAL HEALTH: HOW MANY STANDARD DRINKS CONTAINING ALCOHOL DO YOU HAVE ON A TYPICAL DAY?: 1 OR 2

## 2019-09-20 SDOH — HEALTH STABILITY: MENTAL HEALTH: HOW OFTEN DO YOU HAVE 6 OR MORE DRINKS ON ONE OCCASION?: NEVER

## 2019-09-20 NOTE — PROGRESS NOTES
"cc: Blood in the urine    Subjective:     Sathya Chan is a 54 y.o. male presenting to discuss blood in the urine.  After waking up this morning, he noticed blood in the urine when he was urinating.  It has gradually improved.  Denies any associated symptoms.  Denies any fevers, nausea, vomiting, back pain, abdominal pain, injuries, skin changes, genital lesions, burning with urination, urinary frequency urgency, appetite loss, weight loss.  He does report having unprotected sex last week.  Denies any NSAID use, and alcohol intake has been reasonable.      Review of systems:  See above.       Current Outpatient Medications:   •  losartan-hydrochlorothiazide (HYZAAR) 100-25 MG per tablet, Take 1 Tab by mouth every day., Disp: 90 Tab, Rfl: 3  •  ST JOHNS WORT PO, Take  by mouth., Disp: , Rfl:   •  S-Adenosylmethionine (SAME PO), Take  by mouth., Disp: , Rfl:   •  TRYPTOPHAN PO, Take  by mouth., Disp: , Rfl:   •  5-Hydroxytryptophan (5-HTP PO), Take  by mouth., Disp: , Rfl:   •  Omega-3 Fat Ac-Cholecalciferol (OMEGA ESSENTIALS/VIT D3 PO), Take  by mouth., Disp: , Rfl:   •  Multiple Vitamins-Minerals (MULTIVITAMIN PO), Take  by mouth., Disp: , Rfl:     Allergies, past medical history, past surgical history, family history, social history reviewed and updated    Objective:     Vitals: /70   Pulse 79   Temp 36.3 °C (97.3 °F)   Ht 1.651 m (5' 5\")   Wt 84.8 kg (186 lb 15.2 oz)   SpO2 94%   BMI 31.11 kg/m²   General: Alert, pleasant, NAD  HEENT: Normocephalic.  EOMI, no icterus or pallor.  Conjunctivae and lids normal. External ears normal. Oropharynx non-erythematous, mucous membranes moist.  Neck supple.  No thyromegaly or masses palpated. No cervical or supraclavicular lymphadenopathy.  Heart: Regular rate and rhythm.  S1 and S2 normal.  No murmurs appreciated.  Respiratory: Normal respiratory effort.  Clear to auscultation bilaterally.  Abdomen: Non-distended, soft  Skin: Warm, dry, no " rashes.  Musculoskeletal: Gait is normal.  Moves all extremities well.  Extremities: No leg edema.  Psych:  Affect/mood is normal, judgement is good, memory is intact, grooming is appropriate.    Point-of-care urinalysis: Small LE, 30 mg/dL of protein, large blood    Assessment/Plan:     Diagnoses and all orders for this visit:    Hematuria, unspecified type  New problem.  Discussed various etiologies, work-up.  Will send urine for culture and check the following labs, CT scan.  Referral to urology also placed.  In the meantime, recommended that he avoid NSAIDs, staying well-hydrated.  Discussed signs and symptoms to watch for, reasons to go to the ER.  -     POCT Urinalysis  -     CBC WITHOUT DIFFERENTIAL; Future  -     Comp Metabolic Panel; Future  -     TSH WITH REFLEX TO FT4; Future  -     CT-ABDOMEN-PELVIS WITH; Future  -     REFERRAL TO UROLOGY  -     URINE CULTURE(NEW); Future    Routine screening for STI (sexually transmitted infection)  -     Chlamydia/GC PCR Urine Or Swab; Future    Encounter for screening for malignant neoplasm of prostate  -     PROSTATE SPECIFIC AG SCREENING; Future        Return if symptoms worsen or fail to improve.

## 2019-09-23 LAB
BACTERIA UR CULT: NORMAL
SIGNIFICANT IND 70042: NORMAL
SITE SITE: NORMAL
SOURCE SOURCE: NORMAL

## 2019-09-26 ENCOUNTER — HOSPITAL ENCOUNTER (OUTPATIENT)
Dept: LAB | Facility: MEDICAL CENTER | Age: 54
End: 2019-09-26
Attending: FAMILY MEDICINE
Payer: COMMERCIAL

## 2019-09-26 DIAGNOSIS — Z11.3 ROUTINE SCREENING FOR STI (SEXUALLY TRANSMITTED INFECTION): ICD-10-CM

## 2019-09-26 DIAGNOSIS — R31.9 HEMATURIA, UNSPECIFIED TYPE: ICD-10-CM

## 2019-09-26 DIAGNOSIS — Z12.5 ENCOUNTER FOR SCREENING FOR MALIGNANT NEOPLASM OF PROSTATE: ICD-10-CM

## 2019-09-26 LAB
ALBUMIN SERPL BCP-MCNC: 4.8 G/DL (ref 3.2–4.9)
ALBUMIN/GLOB SERPL: 2.4 G/DL
ALP SERPL-CCNC: 70 U/L (ref 30–99)
ALT SERPL-CCNC: 40 U/L (ref 2–50)
ANION GAP SERPL CALC-SCNC: 8 MMOL/L (ref 0–11.9)
AST SERPL-CCNC: 28 U/L (ref 12–45)
BILIRUB SERPL-MCNC: 0.6 MG/DL (ref 0.1–1.5)
BUN SERPL-MCNC: 24 MG/DL (ref 8–22)
CALCIUM SERPL-MCNC: 9.4 MG/DL (ref 8.5–10.5)
CHLORIDE SERPL-SCNC: 106 MMOL/L (ref 96–112)
CO2 SERPL-SCNC: 25 MMOL/L (ref 20–33)
CREAT SERPL-MCNC: 0.97 MG/DL (ref 0.5–1.4)
ERYTHROCYTE [DISTWIDTH] IN BLOOD BY AUTOMATED COUNT: 45.2 FL (ref 35.9–50)
GLOBULIN SER CALC-MCNC: 2 G/DL (ref 1.9–3.5)
GLUCOSE SERPL-MCNC: 89 MG/DL (ref 65–99)
HCT VFR BLD AUTO: 44.4 % (ref 42–52)
HGB BLD-MCNC: 14.3 G/DL (ref 14–18)
MCH RBC QN AUTO: 29.9 PG (ref 27–33)
MCHC RBC AUTO-ENTMCNC: 32.2 G/DL (ref 33.7–35.3)
MCV RBC AUTO: 92.7 FL (ref 81.4–97.8)
PLATELET # BLD AUTO: 356 K/UL (ref 164–446)
PMV BLD AUTO: 9.4 FL (ref 9–12.9)
POTASSIUM SERPL-SCNC: 3.8 MMOL/L (ref 3.6–5.5)
PROT SERPL-MCNC: 6.8 G/DL (ref 6–8.2)
PSA SERPL-MCNC: 2.03 NG/ML (ref 0–4)
RBC # BLD AUTO: 4.79 M/UL (ref 4.7–6.1)
SODIUM SERPL-SCNC: 139 MMOL/L (ref 135–145)
TSH SERPL DL<=0.005 MIU/L-ACNC: 1.72 UIU/ML (ref 0.38–5.33)
WBC # BLD AUTO: 6.1 K/UL (ref 4.8–10.8)

## 2019-09-26 PROCEDURE — 87591 N.GONORRHOEAE DNA AMP PROB: CPT

## 2019-09-26 PROCEDURE — 80053 COMPREHEN METABOLIC PANEL: CPT

## 2019-09-26 PROCEDURE — 85027 COMPLETE CBC AUTOMATED: CPT

## 2019-09-26 PROCEDURE — 87491 CHLMYD TRACH DNA AMP PROBE: CPT

## 2019-09-26 PROCEDURE — 84443 ASSAY THYROID STIM HORMONE: CPT

## 2019-09-26 PROCEDURE — 36415 COLL VENOUS BLD VENIPUNCTURE: CPT

## 2019-09-26 PROCEDURE — 84153 ASSAY OF PSA TOTAL: CPT

## 2019-10-04 ENCOUNTER — HOSPITAL ENCOUNTER (OUTPATIENT)
Dept: RADIOLOGY | Facility: MEDICAL CENTER | Age: 54
End: 2019-10-04
Attending: FAMILY MEDICINE
Payer: COMMERCIAL

## 2019-10-04 ENCOUNTER — TELEPHONE (OUTPATIENT)
Dept: MEDICAL GROUP | Facility: MEDICAL CENTER | Age: 54
End: 2019-10-04

## 2019-10-04 DIAGNOSIS — R31.9 HEMATURIA, UNSPECIFIED TYPE: ICD-10-CM

## 2019-10-04 NOTE — TELEPHONE ENCOUNTER
Imaging called wanted to know due to PT's insurance if he can do a uroscope instead as there is a high copay.    Please advise

## 2019-10-16 ENCOUNTER — HOSPITAL ENCOUNTER (OUTPATIENT)
Dept: RADIOLOGY | Facility: MEDICAL CENTER | Age: 54
End: 2019-10-16
Attending: FAMILY MEDICINE
Payer: COMMERCIAL

## 2019-10-16 PROCEDURE — 700117 HCHG RX CONTRAST REV CODE 255: Performed by: FAMILY MEDICINE

## 2019-10-16 PROCEDURE — 74178 CT ABD&PLV WO CNTR FLWD CNTR: CPT

## 2019-10-16 RX ADMIN — IOHEXOL 100 ML: 350 INJECTION, SOLUTION INTRAVENOUS at 14:15

## 2019-12-17 ENCOUNTER — OFFICE VISIT (OUTPATIENT)
Dept: MEDICAL GROUP | Facility: MEDICAL CENTER | Age: 54
End: 2019-12-17
Payer: COMMERCIAL

## 2019-12-17 VITALS
BODY MASS INDEX: 31.65 KG/M2 | SYSTOLIC BLOOD PRESSURE: 138 MMHG | OXYGEN SATURATION: 100 % | HEART RATE: 66 BPM | TEMPERATURE: 96.9 F | WEIGHT: 190 LBS | HEIGHT: 65 IN | DIASTOLIC BLOOD PRESSURE: 80 MMHG

## 2019-12-17 DIAGNOSIS — E66.9 OBESITY (BMI 30-39.9): ICD-10-CM

## 2019-12-17 DIAGNOSIS — E78.5 HYPERLIPIDEMIA, UNSPECIFIED HYPERLIPIDEMIA TYPE: ICD-10-CM

## 2019-12-17 DIAGNOSIS — R31.9 HEMATURIA, UNSPECIFIED TYPE: ICD-10-CM

## 2019-12-17 DIAGNOSIS — I10 ESSENTIAL HYPERTENSION: ICD-10-CM

## 2019-12-17 PROCEDURE — 99214 OFFICE O/P EST MOD 30 MIN: CPT | Performed by: FAMILY MEDICINE

## 2019-12-17 NOTE — PROGRESS NOTES
".cc:  HTN    Subjective:     Sathya Chan is a 53 y.o. male presenting for:      1. Hypertension: has a hx of arterial hypertension, has been stable.  He was on losartan-hydrochlorothiazide 100-25 mg daily but stopped it as he thought this might be related to the episode of hematuria that he had a couple months ago.  He thought his kidneys were aching at the time.  He has also stopped all his supplements.  He has been checking his blood pressures at home, have been normal.  Denies any chest pain, shortness of breath, lightheadedness, dizziness, headaches, leg swelling.  · BP today at goal: yes, counseled on target blood pressure goals  · BPs at home: Normal  · Sodium intake: discussed   · DASH diet: discussed   · Physical activity: none, discussed   · Body mass index is 31.62 kg/m²., weight loss discussed   · Alcohol use: couple drinks a week at most   · Tobacco use: nonsmoker  The 10-year ASCVD risk score (Enma PETERS Jr., et al., 2013) is: 8.2%    Values used to calculate the score:      Age: 54 years      Sex: Male      Is Non- : No      Diabetic: No      Tobacco smoker: No      Systolic Blood Pressure: 138 mmHg      Is BP treated: No      HDL Cholesterol: 45 mg/dL  ·     Total Cholesterol: 231 mg/dL      · Last CBC: 6/2019  · Last eGFR: >60 on 9/2019  · Last fasting glucose or a1c: 6/2019  · Last lipid panel: 6/2019  · Last microalbumin: 6/2019  · Last EKG: none  · Last echocardiogram: none     2.  Hyperlipidemia: Has a history of elevated triglycerides and LDL, he declines starting a statin.  He plans to eat healthier and exercise more regularly.    Review of systems:  See above.     No current outpatient medications on file.    Allergies, past medical history, past surgical history, family history, social history reviewed and updated    Objective:     Vitals: /80 (BP Location: Left arm, Patient Position: Sitting)   Pulse 66   Temp 36.1 °C (96.9 °F)   Ht 1.651 m (5' 5\")   Wt " 86.2 kg (190 lb)   SpO2 100%   BMI 31.62 kg/m²   General: Alert, pleasant, NAD  HEENT: Normocephalic.  EOMI, no icterus or pallor.  Conjunctivae and lids normal. External ears normal. Oropharynx non-erythematous, mucous membranes moist.  Neck supple.  No thyromegaly or masses palpated. No cervical or supraclavicular lymphadenopathy.  Heart: Regular rate and rhythm.  S1 and S2 normal.  No murmurs appreciated.  Respiratory: Normal respiratory effort.  Clear to auscultation bilaterally.  Abdomen: Non-distended, soft  Skin: Warm, dry, no rashes.  Musculoskeletal: Gait is normal.  Moves all extremities well.  Extremities: No leg edema.  Psych:  Affect/mood is normal, judgement is good, memory is intact, grooming is appropriate.    Assessment/Plan:     Sathya was seen today for follow-up, hypertension and flu vaccine.    Diagnoses and all orders for this visit:    Essential hypertension  Stable, discussed monitoring for now.  Discussed goals of blood pressure, diet, exercise.  Follow-up in 6 months    Obesity (BMI 30-39.9)  Stable, discussed healthy diet, exercise.    Hyperlipidemia, unspecified hyperlipidemia type  Stable, continue to monitor.  We will recheck a lipid panel at his next visit.    Hematuria, unspecified type  Currently undergoing work-up with urology.  Has a cystoscopy scheduled for January.        Return in about 6 months (around 6/17/2020) for routine follow up.

## 2020-06-18 ENCOUNTER — HOSPITAL ENCOUNTER (OUTPATIENT)
Facility: MEDICAL CENTER | Age: 55
End: 2020-06-18
Attending: FAMILY MEDICINE
Payer: COMMERCIAL

## 2020-06-18 ENCOUNTER — OFFICE VISIT (OUTPATIENT)
Dept: MEDICAL GROUP | Facility: MEDICAL CENTER | Age: 55
End: 2020-06-18
Payer: COMMERCIAL

## 2020-06-18 VITALS
HEART RATE: 74 BPM | RESPIRATION RATE: 16 BRPM | BODY MASS INDEX: 31.16 KG/M2 | DIASTOLIC BLOOD PRESSURE: 78 MMHG | HEIGHT: 65 IN | OXYGEN SATURATION: 96 % | WEIGHT: 187 LBS | TEMPERATURE: 97.1 F | SYSTOLIC BLOOD PRESSURE: 142 MMHG

## 2020-06-18 DIAGNOSIS — I10 ESSENTIAL HYPERTENSION: ICD-10-CM

## 2020-06-18 DIAGNOSIS — Z11.4 ENCOUNTER FOR SCREENING FOR HIV: ICD-10-CM

## 2020-06-18 DIAGNOSIS — E78.5 HYPERLIPIDEMIA, UNSPECIFIED HYPERLIPIDEMIA TYPE: ICD-10-CM

## 2020-06-18 DIAGNOSIS — Z11.3 ROUTINE SCREENING FOR STI (SEXUALLY TRANSMITTED INFECTION): ICD-10-CM

## 2020-06-18 DIAGNOSIS — E66.9 OBESITY (BMI 30-39.9): ICD-10-CM

## 2020-06-18 PROCEDURE — 87591 N.GONORRHOEAE DNA AMP PROB: CPT

## 2020-06-18 PROCEDURE — 87491 CHLMYD TRACH DNA AMP PROBE: CPT

## 2020-06-18 PROCEDURE — 99214 OFFICE O/P EST MOD 30 MIN: CPT | Performed by: FAMILY MEDICINE

## 2020-06-18 RX ORDER — AMLODIPINE BESYLATE 5 MG/1
5 TABLET ORAL DAILY
Qty: 90 TAB | Refills: 1 | Status: SHIPPED | OUTPATIENT
Start: 2020-06-18 | End: 2021-05-07 | Stop reason: SDUPTHER

## 2020-06-18 ASSESSMENT — PATIENT HEALTH QUESTIONNAIRE - PHQ9: CLINICAL INTERPRETATION OF PHQ2 SCORE: 0

## 2020-06-18 ASSESSMENT — FIBROSIS 4 INDEX: FIB4 SCORE: 0.68

## 2020-06-18 NOTE — PROGRESS NOTES
"cc:  HTN    Subjective:     Sathya Chan is a 54 y.o. male presenting for:    1. Hypertension: has a hx of arterial hypertension, has been stable.  Is currently not on any medications. .  Denies any chest pain, shortness of breath, lightheadedness, dizziness, headaches, leg swelling.  · BP today at goal: no, pt counseled on target BP goal   · BPs at home: 140s/70s  · Sodium intake: discussed   · DASH diet: discussed, counseled on diet with whole grains, vegetables   · Physical activity: counseled on vigorous exercise 3x/wk for 40min/session   · Body mass index is 31.12 kg/m²., weight loss recommended   · Alcohol use: less than 7/week   · Tobacco use: nonsmoker  The 10-year ASCVD risk score (Enma PETERS JrBrandy, et al., 2013) is: 8.6%    Values used to calculate the score:      Age: 54 years      Sex: Male      Is Non- : No      Diabetic: No      Tobacco smoker: No      Systolic Blood Pressure: 142 mmHg      Is BP treated: No      HDL Cholesterol: 45 mg/dL  ·     Total Cholesterol: 231 mg/dL      · Last CBC: 9/2019  · Last eGFR: >60 on 9/2019  · Last fasting glucose or a1c: 9/2019  · Last lipid panel: 6/2019  · Last microalbumin: 6/2019   · Last EKG: none  · Last echocardiogram: none    2.  Hyperlipidemia: Has a history of hyperlipidemia.  Is currently not on medication, he declines to start a statin.  He has been trying to eat healthier.  He is exercising more, hiking.  He has lost 3 pounds since his last visit.  He is not interested in seeing a dietitian.      Review of systems:  See above.       Current Outpatient Medications:   •  amLODIPine (NORVASC) 5 MG Tab, Take 1 Tab by mouth every day., Disp: 90 Tab, Rfl: 1    Allergies, past medical history, past surgical history, family history, social history reviewed and updated    Objective:     Vitals: /78   Pulse 74   Temp 36.2 °C (97.1 °F)   Resp 16   Ht 1.651 m (5' 5\")   Wt 84.8 kg (187 lb)   SpO2 96%   BMI 31.12 kg/m²   General: " Alert, pleasant, NAD  HEENT: Normocephalic.   Heart: Regular rate and rhythm.  S1 and S2 normal.  No murmurs appreciated.  Respiratory: Normal respiratory effort.  Clear to auscultation bilaterally.  Abdomen: Non-distended, soft  Skin: Warm, dry, no rashes.  Musculoskeletal: Gait is normal.  Moves all extremities well.  Extremities: No leg edema.  Psych:  Affect/mood is normal, judgement is good, memory is intact, grooming is appropriate.    Assessment/Plan:     Sathya was seen today for follow-up.    Diagnoses and all orders for this visit:    Essential hypertension  Uncontrolled.  We discussed trying a different blood pressure medication, he is willing to try it.  Will start amlodipine 5 mg daily.  He will continue checking his blood pressures at home.  Follow-up in 6 months.  -     Basic Metabolic Panel; Future  -     amLODIPine (NORVASC) 5 MG Tab; Take 1 Tab by mouth every day.    Hyperlipidemia, unspecified hyperlipidemia type  Uncontrolled, long discussion regarding healthy diet, exercise.  We will recheck a lipid panel.  He still declines to start a statin.  -     Lipid Profile; Future    Obesity (BMI 30-39.9)  -     Patient identified as having weight management issue.  Appropriate orders and counseling given.    Routine screening for STI (sexually transmitted infection)  -     Chlamydia/GC PCR Urine Or Swab; Future  -     T.PALLIDUM AB EIA; Future    Encounter for screening for HIV  -     HIV AG/AB COMBO ASSAY SCREENING; Future        Return in about 6 months (around 12/18/2020) for routine follow up.

## 2020-06-26 ENCOUNTER — HOSPITAL ENCOUNTER (OUTPATIENT)
Dept: LAB | Facility: MEDICAL CENTER | Age: 55
End: 2020-06-26
Attending: FAMILY MEDICINE
Payer: COMMERCIAL

## 2020-06-26 DIAGNOSIS — Z11.4 ENCOUNTER FOR SCREENING FOR HIV: ICD-10-CM

## 2020-06-26 DIAGNOSIS — E78.5 HYPERLIPIDEMIA, UNSPECIFIED HYPERLIPIDEMIA TYPE: ICD-10-CM

## 2020-06-26 DIAGNOSIS — I10 ESSENTIAL HYPERTENSION: ICD-10-CM

## 2020-06-26 DIAGNOSIS — Z11.3 ROUTINE SCREENING FOR STI (SEXUALLY TRANSMITTED INFECTION): ICD-10-CM

## 2020-06-26 LAB
ANION GAP SERPL CALC-SCNC: 11 MMOL/L (ref 7–16)
BUN SERPL-MCNC: 15 MG/DL (ref 8–22)
CALCIUM SERPL-MCNC: 9.6 MG/DL (ref 8.5–10.5)
CHLORIDE SERPL-SCNC: 101 MMOL/L (ref 96–112)
CHOLEST SERPL-MCNC: 224 MG/DL (ref 100–199)
CO2 SERPL-SCNC: 26 MMOL/L (ref 20–33)
CREAT SERPL-MCNC: 0.84 MG/DL (ref 0.5–1.4)
FASTING STATUS PATIENT QL REPORTED: NORMAL
GLUCOSE SERPL-MCNC: 95 MG/DL (ref 65–99)
HDLC SERPL-MCNC: 51 MG/DL
LDLC SERPL CALC-MCNC: 143 MG/DL
POTASSIUM SERPL-SCNC: 4 MMOL/L (ref 3.6–5.5)
SODIUM SERPL-SCNC: 138 MMOL/L (ref 135–145)
TRIGL SERPL-MCNC: 152 MG/DL (ref 0–149)

## 2020-06-26 PROCEDURE — 80061 LIPID PANEL: CPT

## 2020-06-26 PROCEDURE — 36415 COLL VENOUS BLD VENIPUNCTURE: CPT

## 2020-06-26 PROCEDURE — 87389 HIV-1 AG W/HIV-1&-2 AB AG IA: CPT

## 2020-06-26 PROCEDURE — 86780 TREPONEMA PALLIDUM: CPT

## 2020-06-26 PROCEDURE — 80048 BASIC METABOLIC PNL TOTAL CA: CPT

## 2020-06-27 LAB
HIV 1+2 AB+HIV1 P24 AG SERPL QL IA: NORMAL
TREPONEMA PALLIDUM IGG+IGM AB [PRESENCE] IN SERUM OR PLASMA BY IMMUNOASSAY: NORMAL

## 2020-12-18 ENCOUNTER — OFFICE VISIT (OUTPATIENT)
Dept: MEDICAL GROUP | Facility: MEDICAL CENTER | Age: 55
End: 2020-12-18
Payer: COMMERCIAL

## 2020-12-18 VITALS
DIASTOLIC BLOOD PRESSURE: 70 MMHG | HEART RATE: 64 BPM | RESPIRATION RATE: 16 BRPM | SYSTOLIC BLOOD PRESSURE: 124 MMHG | HEIGHT: 65 IN | WEIGHT: 190 LBS | OXYGEN SATURATION: 96 % | BODY MASS INDEX: 31.65 KG/M2 | TEMPERATURE: 96.9 F

## 2020-12-18 DIAGNOSIS — Z12.5 ENCOUNTER FOR SCREENING FOR MALIGNANT NEOPLASM OF PROSTATE: ICD-10-CM

## 2020-12-18 DIAGNOSIS — I10 ESSENTIAL HYPERTENSION: ICD-10-CM

## 2020-12-18 DIAGNOSIS — E66.9 OBESITY (BMI 30-39.9): ICD-10-CM

## 2020-12-18 DIAGNOSIS — E78.5 HYPERLIPIDEMIA, UNSPECIFIED HYPERLIPIDEMIA TYPE: ICD-10-CM

## 2020-12-18 PROCEDURE — 99214 OFFICE O/P EST MOD 30 MIN: CPT | Performed by: FAMILY MEDICINE

## 2020-12-18 ASSESSMENT — FIBROSIS 4 INDEX: FIB4 SCORE: 0.7

## 2020-12-18 NOTE — PROGRESS NOTES
"cc: Hypertension    Subjective:     Sathya Chan is a 55 y.o. male presenting for:    1.  Hypertension: Has a history of arterial hypertension, has been stable.  Is currently on amlodipine 5 mg daily.  Denies any side effects.  Denies any chest pain, shortness of breath, lightheadedness, dizziness, leg swelling.  He stopped taking the medication for several days to see what his blood pressures were, he reports that they were quite high off the medication.    2.  Hyperlipidemia: Has a history of hyperlipidemia.  He declines to start a statin.  He has not been watching his diet very well.  He is trying to be more active, exercise more regularly.  The 10-year ASCVD risk score (Enma FRAN Jr., et al., 2013) is: 6.5%      Review of systems:  See above.       Current Outpatient Medications:   •  amLODIPine (NORVASC) 5 MG Tab, Take 1 Tab by mouth every day., Disp: 90 Tab, Rfl: 1    Allergies, past medical history, past surgical history, family history, social history reviewed and updated    Objective:     Vitals: /70   Pulse 64   Temp 36.1 °C (96.9 °F)   Resp 16   Ht 1.651 m (5' 5\")   Wt 86.2 kg (190 lb)   SpO2 96%   BMI 31.62 kg/m²   General: Alert, pleasant, NAD  HEENT: Normocephalic.    Heart: Regular rate and rhythm.  S1 and S2 normal.  No murmurs appreciated.  Respiratory: Normal respiratory effort.  Clear to auscultation bilaterally.  Abdomen: Non-distended, soft  Skin: Warm, dry, no rashes.  Musculoskeletal: Gait is normal.  Moves all extremities well.  Extremities: No leg edema.  Psych:  Affect/mood is normal, judgement is good, memory is intact, grooming is appropriate.    Assessment/Plan:     Sathya was seen today for follow-up.    Diagnoses and all orders for this visit:    Essential hypertension  Stable, continue amlodipine  -     Basic Metabolic Panel; Future    Hyperlipidemia, unspecified hyperlipidemia type  Stable, continue to monitor.  He is not interested in starting a medication for this " yet.  Discussed healthy diet and regular exercise.  -     Basic Metabolic Panel; Future  -     Lipid Profile; Future    Obesity (BMI 30-39.9)  Stable, we discussed healthy diet and regular exercise.    Encounter for screening for malignant neoplasm of prostate  -     PROSTATE SPECIFIC AG SCREENING; Future          Return in about 6 months (around 6/18/2021) for Med check.

## 2020-12-24 ENCOUNTER — HOSPITAL ENCOUNTER (OUTPATIENT)
Facility: MEDICAL CENTER | Age: 55
End: 2020-12-24
Attending: FAMILY MEDICINE
Payer: COMMERCIAL

## 2020-12-24 ENCOUNTER — OFFICE VISIT (OUTPATIENT)
Dept: URGENT CARE | Facility: CLINIC | Age: 55
End: 2020-12-24
Payer: COMMERCIAL

## 2020-12-24 VITALS
RESPIRATION RATE: 14 BRPM | TEMPERATURE: 97.3 F | DIASTOLIC BLOOD PRESSURE: 74 MMHG | HEIGHT: 66 IN | WEIGHT: 190 LBS | HEART RATE: 76 BPM | SYSTOLIC BLOOD PRESSURE: 158 MMHG | BODY MASS INDEX: 30.53 KG/M2 | OXYGEN SATURATION: 97 %

## 2020-12-24 DIAGNOSIS — N30.00 ACUTE CYSTITIS WITHOUT HEMATURIA: ICD-10-CM

## 2020-12-24 LAB
APPEARANCE UR: NORMAL
BILIRUB UR STRIP-MCNC: NEGATIVE MG/DL
COLOR UR AUTO: YELLOW
GLUCOSE UR STRIP.AUTO-MCNC: NEGATIVE MG/DL
KETONES UR STRIP.AUTO-MCNC: NEGATIVE MG/DL
LEUKOCYTE ESTERASE UR QL STRIP.AUTO: NORMAL
NITRITE UR QL STRIP.AUTO: NEGATIVE
PH UR STRIP.AUTO: 7 [PH] (ref 5–8)
PROT UR QL STRIP: NEGATIVE MG/DL
RBC UR QL AUTO: NORMAL
SP GR UR STRIP.AUTO: 1.02
UROBILINOGEN UR STRIP-MCNC: 0.2 MG/DL

## 2020-12-24 PROCEDURE — 81002 URINALYSIS NONAUTO W/O SCOPE: CPT | Performed by: FAMILY MEDICINE

## 2020-12-24 PROCEDURE — 87086 URINE CULTURE/COLONY COUNT: CPT

## 2020-12-24 PROCEDURE — 99214 OFFICE O/P EST MOD 30 MIN: CPT | Performed by: FAMILY MEDICINE

## 2020-12-24 RX ORDER — CIPROFLOXACIN 500 MG/1
500 TABLET, FILM COATED ORAL EVERY 12 HOURS
Qty: 14 TAB | Refills: 0 | Status: SHIPPED | OUTPATIENT
Start: 2020-12-24 | End: 2020-12-31

## 2020-12-24 ASSESSMENT — FIBROSIS 4 INDEX: FIB4 SCORE: 0.7

## 2020-12-24 ASSESSMENT — ENCOUNTER SYMPTOMS
COUGH: 0
SORE THROAT: 0
VOMITING: 0
HEADACHES: 0
SHORTNESS OF BREATH: 0
FEVER: 0

## 2020-12-25 NOTE — PROGRESS NOTES
"Subjective:     Sathya Chan is a 55 y.o. male who presents for Dysuria (discomfort during urination with small discharge x 3 days)    HPI  Pt presents for evaluation of a new problem   Pt with dysuria for the past 3 days   Dysuria is every void and has been slowly worsening  Dysuria is a burning sensation   No associated abdominal pain or back pain  No hematuria or cloudiness  Has not tried any medications to help with this    Review of Systems   Constitutional: Negative for fever.   HENT: Negative for sore throat.    Respiratory: Negative for cough and shortness of breath.    Cardiovascular: Negative for chest pain.   Gastrointestinal: Negative for vomiting.   Genitourinary: Positive for dysuria.   Skin: Negative for rash.   Neurological: Negative for headaches.     PMH:  has a past medical history of Asthma, Hyperlipidemia, and Hypertension. He also has no past medical history of Diabetes (HCC).  MEDS:   Current Outpatient Medications:   •  amLODIPine (NORVASC) 5 MG Tab, Take 1 Tab by mouth every day., Disp: 90 Tab, Rfl: 1  ALLERGIES: No Known Allergies  SURGHX:   Past Surgical History:   Procedure Laterality Date   • VASECTOMY       SOCHX:  reports that he quit smoking about 2 years ago. His smoking use included cigarettes. He started smoking about 41 years ago. He has a 38.00 pack-year smoking history. He has never used smokeless tobacco. He reports current alcohol use of about 4.2 oz of alcohol per week. He reports that he does not use drugs.  FH: Family history was reviewed, not contributing to acute complaint      Objective:   /74 (BP Location: Left arm, Patient Position: Sitting, BP Cuff Size: Adult)   Pulse 76   Temp 36.3 °C (97.3 °F) (Temporal)   Resp 14   Ht 1.676 m (5' 6\")   Wt 86.2 kg (190 lb)   SpO2 97%   BMI 30.67 kg/m²     Physical Exam  Constitutional:       General: He is not in acute distress.     Appearance: He is well-developed. He is not diaphoretic.   HENT:      Head: " Normocephalic and atraumatic.   Pulmonary:      Effort: Pulmonary effort is normal.   Abdominal:      General: Abdomen is flat. Bowel sounds are normal. There is no distension.      Palpations: Abdomen is soft.      Tenderness: There is no abdominal tenderness. There is no right CVA tenderness, left CVA tenderness, guarding or rebound.   Skin:     General: Skin is warm and dry.      Findings: No rash.   Neurological:      Mental Status: He is alert and oriented to person, place, and time.   Psychiatric:         Behavior: Behavior normal.         Thought Content: Thought content normal.         Judgment: Judgment normal.       Assessment/Plan:   Assessment    1. Acute cystitis without hematuria  - POCT Urinalysis  - Urine Culture; Future  - ciprofloxacin (CIPRO) 500 MG Tab; Take 1 Tab by mouth every 12 hours for 7 days.  Dispense: 14 Tab; Refill: 0    Patient with acute cystitis, will treat with Cipro and send urine for culture, follow-up as needed.

## 2020-12-26 DIAGNOSIS — N30.00 ACUTE CYSTITIS WITHOUT HEMATURIA: ICD-10-CM

## 2020-12-28 LAB
BACTERIA UR CULT: NORMAL
SIGNIFICANT IND 70042: NORMAL
SITE SITE: NORMAL
SOURCE SOURCE: NORMAL

## 2021-03-15 DIAGNOSIS — Z23 NEED FOR VACCINATION: ICD-10-CM

## 2021-05-08 RX ORDER — AMLODIPINE BESYLATE 5 MG/1
5 TABLET ORAL DAILY
Qty: 90 TABLET | Refills: 3 | Status: SHIPPED | OUTPATIENT
Start: 2021-05-08 | End: 2022-04-26 | Stop reason: SDUPTHER

## 2021-06-14 ENCOUNTER — HOSPITAL ENCOUNTER (OUTPATIENT)
Dept: LAB | Facility: MEDICAL CENTER | Age: 56
End: 2021-06-14
Attending: FAMILY MEDICINE
Payer: COMMERCIAL

## 2021-06-14 DIAGNOSIS — E78.5 HYPERLIPIDEMIA, UNSPECIFIED HYPERLIPIDEMIA TYPE: ICD-10-CM

## 2021-06-14 DIAGNOSIS — I10 ESSENTIAL HYPERTENSION: ICD-10-CM

## 2021-06-14 DIAGNOSIS — Z12.5 ENCOUNTER FOR SCREENING FOR MALIGNANT NEOPLASM OF PROSTATE: ICD-10-CM

## 2021-06-14 LAB
ANION GAP SERPL CALC-SCNC: 11 MMOL/L (ref 7–16)
BUN SERPL-MCNC: 15 MG/DL (ref 8–22)
CALCIUM SERPL-MCNC: 9.5 MG/DL (ref 8.5–10.5)
CHLORIDE SERPL-SCNC: 102 MMOL/L (ref 96–112)
CHOLEST SERPL-MCNC: 237 MG/DL (ref 100–199)
CO2 SERPL-SCNC: 26 MMOL/L (ref 20–33)
CREAT SERPL-MCNC: 0.91 MG/DL (ref 0.5–1.4)
FASTING STATUS PATIENT QL REPORTED: NORMAL
GLUCOSE SERPL-MCNC: 87 MG/DL (ref 65–99)
HDLC SERPL-MCNC: 46 MG/DL
LDLC SERPL CALC-MCNC: 139 MG/DL
POTASSIUM SERPL-SCNC: 3.9 MMOL/L (ref 3.6–5.5)
PSA SERPL-MCNC: 2.09 NG/ML (ref 0–4)
SODIUM SERPL-SCNC: 139 MMOL/L (ref 135–145)
TRIGL SERPL-MCNC: 262 MG/DL (ref 0–149)

## 2021-06-14 PROCEDURE — 84153 ASSAY OF PSA TOTAL: CPT

## 2021-06-14 PROCEDURE — 80061 LIPID PANEL: CPT

## 2021-06-14 PROCEDURE — 36415 COLL VENOUS BLD VENIPUNCTURE: CPT

## 2021-06-14 PROCEDURE — 80048 BASIC METABOLIC PNL TOTAL CA: CPT

## 2021-06-18 ENCOUNTER — OFFICE VISIT (OUTPATIENT)
Dept: MEDICAL GROUP | Facility: MEDICAL CENTER | Age: 56
End: 2021-06-18
Payer: COMMERCIAL

## 2021-06-18 VITALS
BODY MASS INDEX: 28.64 KG/M2 | OXYGEN SATURATION: 96 % | HEART RATE: 65 BPM | DIASTOLIC BLOOD PRESSURE: 76 MMHG | SYSTOLIC BLOOD PRESSURE: 118 MMHG | TEMPERATURE: 97.5 F | RESPIRATION RATE: 18 BRPM | WEIGHT: 189 LBS | HEIGHT: 68 IN

## 2021-06-18 DIAGNOSIS — E78.5 HYPERLIPIDEMIA, UNSPECIFIED HYPERLIPIDEMIA TYPE: ICD-10-CM

## 2021-06-18 DIAGNOSIS — E55.9 VITAMIN D DEFICIENCY: ICD-10-CM

## 2021-06-18 DIAGNOSIS — I10 ESSENTIAL HYPERTENSION: ICD-10-CM

## 2021-06-18 PROBLEM — E66.9 OBESITY (BMI 30-39.9): Status: RESOLVED | Noted: 2018-12-17 | Resolved: 2021-06-18

## 2021-06-18 PROCEDURE — 99214 OFFICE O/P EST MOD 30 MIN: CPT | Performed by: FAMILY MEDICINE

## 2021-06-18 ASSESSMENT — PATIENT HEALTH QUESTIONNAIRE - PHQ9: CLINICAL INTERPRETATION OF PHQ2 SCORE: 0

## 2021-06-18 ASSESSMENT — PAIN SCALES - GENERAL: PAINLEVEL: NO PAIN

## 2021-06-18 ASSESSMENT — FIBROSIS 4 INDEX: FIB4 SCORE: 0.7

## 2021-06-18 NOTE — PROGRESS NOTES
"  Subjective:     Sathya Chan is a 55 y.o. male presenting for:    1. Hypertension: Has been stable on amlodipine 5 mg daily.  Denies any side effects.  Denies any chest pain, shortness of breath, lightheadedness, dizziness with swelling.  Overall feels well.    2.  Hyperlipidemia: Has been stable.  Is continuing to work on diet and regular exercise.  He has not been as diligent recently.  The 10-year ASCVD risk score (Jal FRAN Jr., et al., 2013) is: 6.9%      Current Outpatient Medications:   •  amLODIPine (NORVASC) 5 MG Tab, Take 1 tablet by mouth every day., Disp: 90 tablet, Rfl: 3    Objective:     Vitals: /76 (BP Location: Left arm, Patient Position: Sitting, BP Cuff Size: Adult)   Pulse 65   Temp 36.4 °C (97.5 °F) (Temporal)   Resp 18   Ht 1.727 m (5' 8\")   Wt 85.7 kg (189 lb)   SpO2 96%   BMI 28.74 kg/m²   General: Alert  HEENT: Normocephalic.   Heart: Regular rate and rhythm.  S1 and S2 normal.  No murmurs appreciated.  Respiratory: Normal respiratory effort.  Clear to auscultation bilaterally.  Abdomen: Non-distended, soft  Extremities: No leg edema.    Assessment/Plan:     Sathya was seen today for annual exam and lab results.    Diagnoses and all orders for this visit:    Essential hypertension  Chronic, stable, continue amlodipine.  Follow-up in 6 months  -     Comp Metabolic Panel; Future  -     CBC WITHOUT DIFFERENTIAL; Future  -     TSH WITH REFLEX TO FT4; Future    Hyperlipidemia, unspecified hyperlipidemia type  Chronic, stable.  Discussed healthy diet and regular exercise.  He would like to continue working on lifestyle modification.  We also discussed checking a CT cardiac score, he would like to think about this for now.  -     Lipid Profile; Future  -     Comp Metabolic Panel; Future    Vitamin D deficiency  Chronic, stable, continue to monitor  -     VITAMIN D,25 HYDROXY; Future          Return in about 6 months (around 12/18/2021) for routine follow up.    "

## 2022-01-05 ENCOUNTER — HOSPITAL ENCOUNTER (OUTPATIENT)
Dept: LAB | Facility: MEDICAL CENTER | Age: 57
End: 2022-01-05
Attending: FAMILY MEDICINE
Payer: COMMERCIAL

## 2022-01-05 DIAGNOSIS — E78.5 HYPERLIPIDEMIA, UNSPECIFIED HYPERLIPIDEMIA TYPE: ICD-10-CM

## 2022-01-05 DIAGNOSIS — E55.9 VITAMIN D DEFICIENCY: ICD-10-CM

## 2022-01-05 DIAGNOSIS — I10 ESSENTIAL HYPERTENSION: ICD-10-CM

## 2022-01-05 LAB
25(OH)D3 SERPL-MCNC: 19 NG/ML (ref 30–100)
ALBUMIN SERPL BCP-MCNC: 4.7 G/DL (ref 3.2–4.9)
ALBUMIN/GLOB SERPL: 2 G/DL
ALP SERPL-CCNC: 108 U/L (ref 30–99)
ALT SERPL-CCNC: 97 U/L (ref 2–50)
ANION GAP SERPL CALC-SCNC: 12 MMOL/L (ref 7–16)
AST SERPL-CCNC: 51 U/L (ref 12–45)
BILIRUB SERPL-MCNC: 0.4 MG/DL (ref 0.1–1.5)
BUN SERPL-MCNC: 13 MG/DL (ref 8–22)
CALCIUM SERPL-MCNC: 9.6 MG/DL (ref 8.5–10.5)
CHLORIDE SERPL-SCNC: 103 MMOL/L (ref 96–112)
CHOLEST SERPL-MCNC: 248 MG/DL (ref 100–199)
CO2 SERPL-SCNC: 23 MMOL/L (ref 20–33)
CREAT SERPL-MCNC: 0.83 MG/DL (ref 0.5–1.4)
ERYTHROCYTE [DISTWIDTH] IN BLOOD BY AUTOMATED COUNT: 43.1 FL (ref 35.9–50)
FASTING STATUS PATIENT QL REPORTED: NORMAL
GLOBULIN SER CALC-MCNC: 2.4 G/DL (ref 1.9–3.5)
GLUCOSE SERPL-MCNC: 95 MG/DL (ref 65–99)
HCT VFR BLD AUTO: 47 % (ref 42–52)
HDLC SERPL-MCNC: 47 MG/DL
HGB BLD-MCNC: 15.8 G/DL (ref 14–18)
LDLC SERPL CALC-MCNC: 168 MG/DL
MCH RBC QN AUTO: 30 PG (ref 27–33)
MCHC RBC AUTO-ENTMCNC: 33.6 G/DL (ref 33.7–35.3)
MCV RBC AUTO: 89.2 FL (ref 81.4–97.8)
PLATELET # BLD AUTO: 361 K/UL (ref 164–446)
PMV BLD AUTO: 9.2 FL (ref 9–12.9)
POTASSIUM SERPL-SCNC: 4.6 MMOL/L (ref 3.6–5.5)
PROT SERPL-MCNC: 7.1 G/DL (ref 6–8.2)
RBC # BLD AUTO: 5.27 M/UL (ref 4.7–6.1)
SODIUM SERPL-SCNC: 138 MMOL/L (ref 135–145)
TRIGL SERPL-MCNC: 166 MG/DL (ref 0–149)
TSH SERPL DL<=0.005 MIU/L-ACNC: 2.28 UIU/ML (ref 0.38–5.33)
WBC # BLD AUTO: 7.3 K/UL (ref 4.8–10.8)

## 2022-01-05 PROCEDURE — 85027 COMPLETE CBC AUTOMATED: CPT

## 2022-01-05 PROCEDURE — 84443 ASSAY THYROID STIM HORMONE: CPT

## 2022-01-05 PROCEDURE — 82306 VITAMIN D 25 HYDROXY: CPT

## 2022-01-05 PROCEDURE — 80053 COMPREHEN METABOLIC PANEL: CPT

## 2022-01-05 PROCEDURE — 36415 COLL VENOUS BLD VENIPUNCTURE: CPT

## 2022-01-05 PROCEDURE — 80061 LIPID PANEL: CPT

## 2022-01-07 ENCOUNTER — OFFICE VISIT (OUTPATIENT)
Dept: MEDICAL GROUP | Facility: MEDICAL CENTER | Age: 57
End: 2022-01-07
Payer: COMMERCIAL

## 2022-01-07 VITALS
WEIGHT: 190.26 LBS | OXYGEN SATURATION: 95 % | HEIGHT: 68 IN | BODY MASS INDEX: 28.83 KG/M2 | DIASTOLIC BLOOD PRESSURE: 74 MMHG | SYSTOLIC BLOOD PRESSURE: 132 MMHG | HEART RATE: 76 BPM | TEMPERATURE: 98 F

## 2022-01-07 DIAGNOSIS — E55.9 VITAMIN D DEFICIENCY: ICD-10-CM

## 2022-01-07 DIAGNOSIS — I10 ESSENTIAL HYPERTENSION: ICD-10-CM

## 2022-01-07 DIAGNOSIS — E78.5 HYPERLIPIDEMIA, UNSPECIFIED HYPERLIPIDEMIA TYPE: ICD-10-CM

## 2022-01-07 PROCEDURE — 99214 OFFICE O/P EST MOD 30 MIN: CPT | Performed by: FAMILY MEDICINE

## 2022-01-07 RX ORDER — ERGOCALCIFEROL 1.25 MG/1
50000 CAPSULE ORAL
Qty: 12 CAPSULE | Refills: 0 | Status: SHIPPED | OUTPATIENT
Start: 2022-01-07 | End: 2022-07-07

## 2022-01-07 ASSESSMENT — FIBROSIS 4 INDEX: FIB4 SCORE: 0.82

## 2022-01-07 ASSESSMENT — PATIENT HEALTH QUESTIONNAIRE - PHQ9: CLINICAL INTERPRETATION OF PHQ2 SCORE: 0

## 2022-01-07 NOTE — PROGRESS NOTES
"  Subjective:     Sathya Chan is a 56 y.o. male presenting for a follow-up of hypertension.  Blood pressures remain normal.  He continues on the amlodipine 5 mg daily.  His vitamin D level was noted to be low on his recent labs.  His cholesterol levels have slightly increased.  He continues to work on a healthy diet, regular exercise.  Declines a medication at this time.  He does report drinking alcohol the night before his lab work, which may explain his elevated LFTs.    The 10-year ASCVD risk score (Enma FRAN Jr., et al., 2013) is: 9.3%      Current Outpatient Medications:   •  vitamin D2, Ergocalciferol, (DRISDOL) 1.25 MG (38153 UT) Cap capsule, Take 1 Capsule by mouth every 7 days., Disp: 12 Capsule, Rfl: 0  •  amLODIPine (NORVASC) 5 MG Tab, Take 1 tablet by mouth every day., Disp: 90 tablet, Rfl: 3    Objective:     Vitals: /74 (BP Location: Right arm, Patient Position: Sitting, BP Cuff Size: Adult)   Pulse 76   Temp 36.7 °C (98 °F) (Temporal)   Ht 1.727 m (5' 8\")   Wt 86.3 kg (190 lb 4.1 oz)   SpO2 95%   BMI 28.93 kg/m²   General: Alert  HEENT: Normocephalic.   Heart: Regular rate and rhythm.  S1 and S2 normal.  No murmurs appreciated.  Respiratory: Normal respiratory effort.  Clear to auscultation bilaterally.  Abdomen: Non-distended, soft  Extremities: No leg edema.    Assessment/Plan:     Sathya was seen today for lab results and hypertension follow-up.    Diagnoses and all orders for this visit:    Essential hypertension  Chronic, stable, continue amlodipine    Vitamin D deficiency  Chronic, uncontrolled, will start a weekly vitamin D supplement for 3 months  -     vitamin D2, Ergocalciferol, (DRISDOL) 1.25 MG (63945 UT) Cap capsule; Take 1 Capsule by mouth every 7 days.      Hyperlipidemia, unspecified hyperlipidemia type  Chronic, stable.  We discussed healthy diet, regular exercise.  He declined a medication at this time.  We will continue to monitor        Return in about 6 months " (around 7/7/2022) for routine follow up.

## 2022-04-27 RX ORDER — AMLODIPINE BESYLATE 5 MG/1
5 TABLET ORAL DAILY
Qty: 90 TABLET | Refills: 3 | Status: SHIPPED | OUTPATIENT
Start: 2022-04-27 | End: 2022-08-01 | Stop reason: SDUPTHER

## 2022-07-07 ENCOUNTER — OFFICE VISIT (OUTPATIENT)
Dept: MEDICAL GROUP | Facility: MEDICAL CENTER | Age: 57
End: 2022-07-07
Payer: COMMERCIAL

## 2022-07-07 VITALS
HEIGHT: 68 IN | WEIGHT: 187 LBS | DIASTOLIC BLOOD PRESSURE: 72 MMHG | TEMPERATURE: 97.6 F | SYSTOLIC BLOOD PRESSURE: 128 MMHG | BODY MASS INDEX: 28.34 KG/M2 | HEART RATE: 68 BPM | OXYGEN SATURATION: 97 % | RESPIRATION RATE: 16 BRPM

## 2022-07-07 DIAGNOSIS — K92.1 BLOOD IN STOOL: ICD-10-CM

## 2022-07-07 DIAGNOSIS — I10 ESSENTIAL HYPERTENSION: ICD-10-CM

## 2022-07-07 DIAGNOSIS — L98.9 SKIN LESION: ICD-10-CM

## 2022-07-07 DIAGNOSIS — E78.5 HYPERLIPIDEMIA, UNSPECIFIED HYPERLIPIDEMIA TYPE: ICD-10-CM

## 2022-07-07 PROCEDURE — 99214 OFFICE O/P EST MOD 30 MIN: CPT | Performed by: FAMILY MEDICINE

## 2022-07-07 ASSESSMENT — FIBROSIS 4 INDEX: FIB4 SCORE: 0.82

## 2022-07-07 NOTE — PROGRESS NOTES
"  Subjective:     Sathya Chan is a 56 y.o. male presenting for a follow-up.  He continues on amlodipine regularly.  Denies any side effects.  He has been exercising, being more active lately, is hiking more.  He has noticed several episodes of blood in the stool for the past 7 months.  His last colonoscopy was in 2015.  He also has skin lesions on his left chest and left ankle that are itchy and bothersome for the past month.        Current Outpatient Medications:   •  amLODIPine (NORVASC) 5 MG Tab, Take 1 Tablet by mouth every day., Disp: 90 Tablet, Rfl: 3    Objective:     Vitals: /72   Pulse 68   Temp 36.4 °C (97.6 °F)   Resp 16   Ht 1.727 m (5' 8\")   Wt 84.8 kg (187 lb)   SpO2 97%   BMI 28.43 kg/m²   General: Alert  HEENT: Normocephalic.   Heart: Regular rate and rhythm.  S1 and S2 normal.  No murmurs appreciated.  Respiratory: Normal respiratory effort.  Clear to auscultation bilaterally.  Abdomen: Non-distended, soft  Extremities: No leg edema.    Assessment/Plan:     Sathya was seen today for follow-up.    Diagnoses and all orders for this visit:    Essential hypertension  Chronic, stable, continue amlodipine    Hyperlipidemia, unspecified hyperlipidemia type  Chronic, stable, continue to monitor.  He declines medication at this time. The 10-year ASCVD risk score (Enma DC Jr., et al., 2013) is: 8.8%  -     Lipid Profile; Future  -     Comp Metabolic Panel; Future    Blood in stool  Undiagnosed new problem with uncertain prognosis.  Referral to GI placed  -     Referral to Gastroenterology  -     CBC WITHOUT DIFFERENTIAL; Future    Skin lesion  Self-limited issue. Discussed trying cortisone cream.  We discussed using cryotherapy on his skin lesions at his next visit if they persist        Return in about 6 months (around 1/7/2023) for routine follow up.    "

## 2022-08-02 RX ORDER — AMLODIPINE BESYLATE 5 MG/1
5 TABLET ORAL DAILY
Qty: 90 TABLET | Refills: 3 | Status: SHIPPED | OUTPATIENT
Start: 2022-08-02 | End: 2023-07-24 | Stop reason: SDUPTHER

## 2023-01-05 ENCOUNTER — APPOINTMENT (OUTPATIENT)
Dept: MEDICAL GROUP | Facility: MEDICAL CENTER | Age: 58
End: 2023-01-05
Payer: COMMERCIAL

## 2023-02-06 ENCOUNTER — OFFICE VISIT (OUTPATIENT)
Dept: MEDICAL GROUP | Facility: MEDICAL CENTER | Age: 58
End: 2023-02-06
Payer: COMMERCIAL

## 2023-02-06 VITALS
HEART RATE: 60 BPM | WEIGHT: 185 LBS | DIASTOLIC BLOOD PRESSURE: 76 MMHG | OXYGEN SATURATION: 98 % | TEMPERATURE: 97.8 F | SYSTOLIC BLOOD PRESSURE: 128 MMHG | HEIGHT: 68 IN | RESPIRATION RATE: 16 BRPM | BODY MASS INDEX: 28.04 KG/M2

## 2023-02-06 DIAGNOSIS — E55.9 VITAMIN D DEFICIENCY: ICD-10-CM

## 2023-02-06 DIAGNOSIS — I10 ESSENTIAL HYPERTENSION: ICD-10-CM

## 2023-02-06 DIAGNOSIS — Z12.5 ENCOUNTER FOR SCREENING FOR MALIGNANT NEOPLASM OF PROSTATE: ICD-10-CM

## 2023-02-06 DIAGNOSIS — L91.8 SKIN TAG: ICD-10-CM

## 2023-02-06 DIAGNOSIS — E78.5 HYPERLIPIDEMIA, UNSPECIFIED HYPERLIPIDEMIA TYPE: ICD-10-CM

## 2023-02-06 PROCEDURE — 11200 RMVL SKIN TAGS UP TO&INC 15: CPT | Performed by: FAMILY MEDICINE

## 2023-02-06 PROCEDURE — 99214 OFFICE O/P EST MOD 30 MIN: CPT | Mod: 25 | Performed by: FAMILY MEDICINE

## 2023-02-06 ASSESSMENT — FIBROSIS 4 INDEX: FIB4 SCORE: 0.83

## 2023-02-06 ASSESSMENT — PATIENT HEALTH QUESTIONNAIRE - PHQ9: CLINICAL INTERPRETATION OF PHQ2 SCORE: 0

## 2023-02-06 NOTE — PROGRESS NOTES
"  Subjective:     Sathya Chan is a 57 y.o. male presenting for a follow-up.  His blood pressures remain normal.  He continues on amlodipine 5 mg daily.  Denies any side effects.  Denies any chest pain, shortness of breath, lightheadedness, dizziness, leg swelling.  He has not been exercising or watching his diet.  He continues to have the skin lesion on his left ankle.   it is bothersome, is right along his sock line    The 10-year ASCVD risk score (Reese DK, et al., 2019) is: 9.5%        Current Outpatient Medications:     amLODIPine (NORVASC) 5 MG Tab, Take 1 Tablet by mouth every day., Disp: 90 Tablet, Rfl: 3    Objective:     Vitals: /76   Pulse 60   Temp 36.6 °C (97.8 °F)   Resp 16   Ht 1.727 m (5' 8\")   Wt 83.9 kg (185 lb)   SpO2 98%   BMI 28.13 kg/m²   General: Alert  HEENT: Normocephalic.   Heart: Regular rate and rhythm.  S1 and S2 normal.  No murmurs appreciated.  Respiratory: Normal respiratory effort.  Clear to auscultation bilaterally.  Abdomen: Non-distended, soft  Extremities: No leg edema.  Skin: 1 cm faintly pigmented papule above the left lateral ankle    CRYOTHERAPY:  Discussed risks and benefits of cryotherapy. Patient verbally agreed. 3 applications of cryotherapy were applied to the lesion above the left lateral ankle. Patient tolerated procedure well. Aftercare instructions given.      Assessment/Plan:     Sathya was seen today for follow-up.    Diagnoses and all orders for this visit:    Essential hypertension  Chronic, stable, continue amlodipine.  Follow-up in 6 months  -     CBC WITHOUT DIFFERENTIAL; Future  -     Comp Metabolic Panel; Future    Hyperlipidemia, unspecified hyperlipidemia type  Chronic, possibly stable.  We discussed starting a cholesterol medication.  He would like to think about this for now.  -     Comp Metabolic Panel; Future  -     Lipid Profile; Future    Vitamin D deficiency  Chronic, stable, continue to monitor  -     VITAMIN D,25 HYDROXY " (DEFICIENCY); Future    Encounter for screening for malignant neoplasm of prostate  -     PROSTATE SPECIFIC AG SCREENING; Future    Skin tag  Cryotherapy applied today        Return in about 6 months (around 8/6/2023) for routine follow up.

## 2023-07-25 RX ORDER — AMLODIPINE BESYLATE 5 MG/1
5 TABLET ORAL DAILY
Qty: 90 TABLET | Refills: 3 | Status: SHIPPED | OUTPATIENT
Start: 2023-07-25

## 2023-08-10 ENCOUNTER — OFFICE VISIT (OUTPATIENT)
Dept: MEDICAL GROUP | Facility: MEDICAL CENTER | Age: 58
End: 2023-08-10
Payer: COMMERCIAL

## 2023-08-10 VITALS
DIASTOLIC BLOOD PRESSURE: 70 MMHG | RESPIRATION RATE: 16 BRPM | BODY MASS INDEX: 28.34 KG/M2 | TEMPERATURE: 97.3 F | OXYGEN SATURATION: 97 % | WEIGHT: 187 LBS | SYSTOLIC BLOOD PRESSURE: 122 MMHG | HEART RATE: 86 BPM | HEIGHT: 68 IN

## 2023-08-10 DIAGNOSIS — I10 ESSENTIAL HYPERTENSION: ICD-10-CM

## 2023-08-10 PROCEDURE — 99213 OFFICE O/P EST LOW 20 MIN: CPT | Performed by: FAMILY MEDICINE

## 2023-08-10 PROCEDURE — 3078F DIAST BP <80 MM HG: CPT | Performed by: FAMILY MEDICINE

## 2023-08-10 PROCEDURE — 3074F SYST BP LT 130 MM HG: CPT | Performed by: FAMILY MEDICINE

## 2023-08-10 ASSESSMENT — FIBROSIS 4 INDEX: FIB4 SCORE: 0.83

## 2023-08-10 NOTE — PROGRESS NOTES
"  Subjective:     Sathya Chan is a 57 y.o. male presenting for a follow up          Current Outpatient Medications:     amLODIPine (NORVASC) 5 MG Tab, Take 1 Tablet by mouth every day., Disp: 90 Tablet, Rfl: 3    Objective:     Vitals: /70   Pulse 86   Temp 36.3 °C (97.3 °F)   Resp 16   Ht 1.727 m (5' 8\")   Wt 84.8 kg (187 lb)   SpO2 97%   BMI 28.43 kg/m²   General: Alert  HEENT: Normocephalic.   Heart: Regular rate and rhythm.  S1 and S2 normal.  No murmurs appreciated.  Respiratory: Normal respiratory effort.  Clear to auscultation bilaterally.  Abdomen: Non-distended, soft      Assessment/Plan:     Diagnoses and all orders for this visit:    Essential hypertension  Chronic, stable.  Blood pressures remain well-controlled.  He has been eating healthier.  He does not exercise regularly, but is active at work.  He continues on amlodipine 5 mg daily.  Denies any side effects.  He has not done his labs yet, he plans to do them soon.        "

## 2023-08-29 ENCOUNTER — DOCUMENTATION (OUTPATIENT)
Dept: HEALTH INFORMATION MANAGEMENT | Facility: OTHER | Age: 58
End: 2023-08-29
Payer: COMMERCIAL

## 2023-10-03 SDOH — HEALTH STABILITY: PHYSICAL HEALTH: ON AVERAGE, HOW MANY MINUTES DO YOU ENGAGE IN EXERCISE AT THIS LEVEL?: 0 MIN

## 2023-10-03 SDOH — ECONOMIC STABILITY: FOOD INSECURITY: WITHIN THE PAST 12 MONTHS, THE FOOD YOU BOUGHT JUST DIDN'T LAST AND YOU DIDN'T HAVE MONEY TO GET MORE.: NEVER TRUE

## 2023-10-03 SDOH — HEALTH STABILITY: MENTAL HEALTH
STRESS IS WHEN SOMEONE FEELS TENSE, NERVOUS, ANXIOUS, OR CAN'T SLEEP AT NIGHT BECAUSE THEIR MIND IS TROUBLED. HOW STRESSED ARE YOU?: NOT AT ALL

## 2023-10-03 SDOH — ECONOMIC STABILITY: HOUSING INSECURITY: IN THE LAST 12 MONTHS, HOW MANY PLACES HAVE YOU LIVED?: 1

## 2023-10-03 SDOH — HEALTH STABILITY: PHYSICAL HEALTH: ON AVERAGE, HOW MANY DAYS PER WEEK DO YOU ENGAGE IN MODERATE TO STRENUOUS EXERCISE (LIKE A BRISK WALK)?: 0 DAYS

## 2023-10-03 SDOH — ECONOMIC STABILITY: INCOME INSECURITY: IN THE LAST 12 MONTHS, WAS THERE A TIME WHEN YOU WERE NOT ABLE TO PAY THE MORTGAGE OR RENT ON TIME?: NO

## 2023-10-03 SDOH — ECONOMIC STABILITY: FOOD INSECURITY: WITHIN THE PAST 12 MONTHS, YOU WORRIED THAT YOUR FOOD WOULD RUN OUT BEFORE YOU GOT MONEY TO BUY MORE.: NEVER TRUE

## 2023-10-03 SDOH — ECONOMIC STABILITY: TRANSPORTATION INSECURITY
IN THE PAST 12 MONTHS, HAS LACK OF RELIABLE TRANSPORTATION KEPT YOU FROM MEDICAL APPOINTMENTS, MEETINGS, WORK OR FROM GETTING THINGS NEEDED FOR DAILY LIVING?: NO

## 2023-10-03 SDOH — ECONOMIC STABILITY: TRANSPORTATION INSECURITY
IN THE PAST 12 MONTHS, HAS LACK OF TRANSPORTATION KEPT YOU FROM MEETINGS, WORK, OR FROM GETTING THINGS NEEDED FOR DAILY LIVING?: NO

## 2023-10-03 SDOH — ECONOMIC STABILITY: HOUSING INSECURITY
IN THE LAST 12 MONTHS, WAS THERE A TIME WHEN YOU DID NOT HAVE A STEADY PLACE TO SLEEP OR SLEPT IN A SHELTER (INCLUDING NOW)?: NO

## 2023-10-03 SDOH — ECONOMIC STABILITY: INCOME INSECURITY: HOW HARD IS IT FOR YOU TO PAY FOR THE VERY BASICS LIKE FOOD, HOUSING, MEDICAL CARE, AND HEATING?: NOT VERY HARD

## 2023-10-03 SDOH — ECONOMIC STABILITY: TRANSPORTATION INSECURITY
IN THE PAST 12 MONTHS, HAS THE LACK OF TRANSPORTATION KEPT YOU FROM MEDICAL APPOINTMENTS OR FROM GETTING MEDICATIONS?: NO

## 2023-10-03 ASSESSMENT — LIFESTYLE VARIABLES
HOW MANY STANDARD DRINKS CONTAINING ALCOHOL DO YOU HAVE ON A TYPICAL DAY: 3 OR 4
SKIP TO QUESTIONS 9-10: 0
HOW OFTEN DO YOU HAVE A DRINK CONTAINING ALCOHOL: 2-3 TIMES A WEEK
HOW OFTEN DO YOU HAVE SIX OR MORE DRINKS ON ONE OCCASION: LESS THAN MONTHLY
AUDIT-C TOTAL SCORE: 5

## 2023-10-03 ASSESSMENT — SOCIAL DETERMINANTS OF HEALTH (SDOH)
IN A TYPICAL WEEK, HOW MANY TIMES DO YOU TALK ON THE PHONE WITH FAMILY, FRIENDS, OR NEIGHBORS?: MORE THAN THREE TIMES A WEEK
HOW OFTEN DO YOU ATTENT MEETINGS OF THE CLUB OR ORGANIZATION YOU BELONG TO?: MORE THAN 4 TIMES PER YEAR
HOW OFTEN DO YOU HAVE A DRINK CONTAINING ALCOHOL: 2-3 TIMES A WEEK
WITHIN THE PAST 12 MONTHS, YOU WORRIED THAT YOUR FOOD WOULD RUN OUT BEFORE YOU GOT THE MONEY TO BUY MORE: NEVER TRUE
IN A TYPICAL WEEK, HOW MANY TIMES DO YOU TALK ON THE PHONE WITH FAMILY, FRIENDS, OR NEIGHBORS?: MORE THAN THREE TIMES A WEEK
HOW OFTEN DO YOU HAVE SIX OR MORE DRINKS ON ONE OCCASION: LESS THAN MONTHLY
HOW HARD IS IT FOR YOU TO PAY FOR THE VERY BASICS LIKE FOOD, HOUSING, MEDICAL CARE, AND HEATING?: NOT VERY HARD
HOW OFTEN DO YOU GET TOGETHER WITH FRIENDS OR RELATIVES?: ONCE A WEEK
HOW OFTEN DO YOU GET TOGETHER WITH FRIENDS OR RELATIVES?: ONCE A WEEK
HOW MANY DRINKS CONTAINING ALCOHOL DO YOU HAVE ON A TYPICAL DAY WHEN YOU ARE DRINKING: 3 OR 4
HOW OFTEN DO YOU ATTEND CHURCH OR RELIGIOUS SERVICES?: MORE THAN 4 TIMES PER YEAR
HOW OFTEN DO YOU ATTEND CHURCH OR RELIGIOUS SERVICES?: MORE THAN 4 TIMES PER YEAR
HOW OFTEN DO YOU ATTENT MEETINGS OF THE CLUB OR ORGANIZATION YOU BELONG TO?: MORE THAN 4 TIMES PER YEAR
DO YOU BELONG TO ANY CLUBS OR ORGANIZATIONS SUCH AS CHURCH GROUPS UNIONS, FRATERNAL OR ATHLETIC GROUPS, OR SCHOOL GROUPS?: YES
DO YOU BELONG TO ANY CLUBS OR ORGANIZATIONS SUCH AS CHURCH GROUPS UNIONS, FRATERNAL OR ATHLETIC GROUPS, OR SCHOOL GROUPS?: YES

## 2023-10-04 ENCOUNTER — OFFICE VISIT (OUTPATIENT)
Dept: MEDICAL GROUP | Facility: MEDICAL CENTER | Age: 58
End: 2023-10-04
Payer: COMMERCIAL

## 2023-10-04 VITALS
TEMPERATURE: 97.6 F | BODY MASS INDEX: 28.34 KG/M2 | OXYGEN SATURATION: 96 % | HEART RATE: 70 BPM | RESPIRATION RATE: 16 BRPM | WEIGHT: 187 LBS | SYSTOLIC BLOOD PRESSURE: 124 MMHG | DIASTOLIC BLOOD PRESSURE: 70 MMHG | HEIGHT: 68 IN

## 2023-10-04 DIAGNOSIS — I10 ESSENTIAL HYPERTENSION: ICD-10-CM

## 2023-10-04 DIAGNOSIS — E78.5 HYPERLIPIDEMIA, UNSPECIFIED HYPERLIPIDEMIA TYPE: ICD-10-CM

## 2023-10-04 DIAGNOSIS — R74.8 ELEVATED LIVER ENZYMES: ICD-10-CM

## 2023-10-04 DIAGNOSIS — H57.9 ITCHY EYES: ICD-10-CM

## 2023-10-04 DIAGNOSIS — Z12.5 ENCOUNTER FOR SCREENING FOR MALIGNANT NEOPLASM OF PROSTATE: ICD-10-CM

## 2023-10-04 DIAGNOSIS — Z76.89 ENCOUNTER TO ESTABLISH CARE: ICD-10-CM

## 2023-10-04 DIAGNOSIS — E55.9 VITAMIN D DEFICIENCY: ICD-10-CM

## 2023-10-04 PROCEDURE — 99214 OFFICE O/P EST MOD 30 MIN: CPT | Performed by: NURSE PRACTITIONER

## 2023-10-04 PROCEDURE — 3078F DIAST BP <80 MM HG: CPT | Performed by: NURSE PRACTITIONER

## 2023-10-04 PROCEDURE — 3074F SYST BP LT 130 MM HG: CPT | Performed by: NURSE PRACTITIONER

## 2023-10-04 ASSESSMENT — FIBROSIS 4 INDEX: FIB4 SCORE: 0.85

## 2023-10-04 NOTE — PROGRESS NOTES
"Chief Complaint   Patient presents with    Establish Care       Subjective:     HPI:     Sathya Chan is a 58 y.o. male here to establish care and to discuss the following:    Problem   Elevated Liver Enzymes    Noted on last labs.      Essential Hypertension    Stable with Amlodipine.      Hyperlipidemia    Persistently elevated-not on statin therapy     Vitamin D Deficiency    Not taking OTC        Reports having possibly allergy irritation to his eyes.  Reports itching specially when outdoors.  No excessive watering or redness noted  -recommend OTC eye drops.       ROS: : see above        Current Outpatient Medications:     amLODIPine (NORVASC) 5 MG Tab, Take 1 Tablet by mouth every day., Disp: 90 Tablet, Rfl: 3    No Known Allergies    Objective:     Vitals: /70   Pulse 70   Temp 36.4 °C (97.6 °F)   Resp 16   Ht 1.727 m (5' 8\")   Wt 84.8 kg (187 lb)   SpO2 96%   BMI 28.43 kg/m²    General: Alert, pleasant, NAD  HEENT: Normocephalic.  Neck supple.   Respiratory: no distress, no audible wheezing, RR -WNL  Heart:  Skin: Warm, dry, no rashes.  Extremities: No leg edema. No discoloration  Neurological: No tremors  Psych:  Affect/mood is normal, judgement is good, memory is intact, grooming is appropriate.    Assessment/Plan:      1. Essential hypertension  Chronic problem.  Has been well controlled with amlodipine.  Denies any chest pain, shortness breath, dizziness, leg swelling.  Imminently checking his BP at home.  Overdue for blood work.  Stressed the importance of annual routine blood work -he does not that he would get these completed.  - MICROALBUMIN CREAT RATIO URINE; Future  - Comp Metabolic Panel; Future  - CBC WITHOUT DIFFERENTIAL; Future    2. Itchy eyes  Acute, recommend OTC allergy eyedrops, cool compress.    3. Hyperlipidemia, unspecified hyperlipidemia type  Chronic, currently not onstatin therapy.  Recommend update lipid panel.  Consider coronary artery calcium scan.  - Lipid " Profile; Future    4. Elevated liver enzymes  Noted on previous labs however patient has not completed follow-up labs.  Recommend updating CMP to follow-up.    5. Encounter for screening for malignant neoplasm of prostate  - PROSTATE SPECIFIC AG SCREENING; Future    6. Vitamin D deficiency  Currently is not taking any supplement.  Recommend taking OTC supplement vitamin D3 2000 IU daily.  Update vitamin D level.  - VITAMIN D,25 HYDROXY (DEFICIENCY); Future    7. Encounter to establish care      Return for As needed for abnormal labs..      Marsha VARNER.

## 2024-03-23 ENCOUNTER — HOSPITAL ENCOUNTER (OUTPATIENT)
Dept: LAB | Facility: MEDICAL CENTER | Age: 59
End: 2024-03-23
Attending: NURSE PRACTITIONER
Payer: COMMERCIAL

## 2024-03-23 DIAGNOSIS — E78.5 HYPERLIPIDEMIA, UNSPECIFIED HYPERLIPIDEMIA TYPE: ICD-10-CM

## 2024-03-23 DIAGNOSIS — E55.9 VITAMIN D DEFICIENCY: ICD-10-CM

## 2024-03-23 DIAGNOSIS — I10 ESSENTIAL HYPERTENSION: ICD-10-CM

## 2024-03-23 DIAGNOSIS — Z12.5 ENCOUNTER FOR SCREENING FOR MALIGNANT NEOPLASM OF PROSTATE: ICD-10-CM

## 2024-03-23 LAB
25(OH)D3 SERPL-MCNC: 13 NG/ML (ref 30–100)
ALBUMIN SERPL BCP-MCNC: 4.7 G/DL (ref 3.2–4.9)
ALBUMIN/GLOB SERPL: 1.8 G/DL
ALP SERPL-CCNC: 92 U/L (ref 30–99)
ALT SERPL-CCNC: 47 U/L (ref 2–50)
ANION GAP SERPL CALC-SCNC: 10 MMOL/L (ref 7–16)
AST SERPL-CCNC: 29 U/L (ref 12–45)
BILIRUB SERPL-MCNC: 0.6 MG/DL (ref 0.1–1.5)
BUN SERPL-MCNC: 16 MG/DL (ref 8–22)
CALCIUM ALBUM COR SERPL-MCNC: 8.7 MG/DL (ref 8.5–10.5)
CALCIUM SERPL-MCNC: 9.3 MG/DL (ref 8.5–10.5)
CHLORIDE SERPL-SCNC: 104 MMOL/L (ref 96–112)
CHOLEST SERPL-MCNC: 248 MG/DL (ref 100–199)
CO2 SERPL-SCNC: 26 MMOL/L (ref 20–33)
CREAT SERPL-MCNC: 0.83 MG/DL (ref 0.5–1.4)
CREAT UR-MCNC: 106.67 MG/DL
ERYTHROCYTE [DISTWIDTH] IN BLOOD BY AUTOMATED COUNT: 42 FL (ref 35.9–50)
FASTING STATUS PATIENT QL REPORTED: NORMAL
GFR SERPLBLD CREATININE-BSD FMLA CKD-EPI: 100 ML/MIN/1.73 M 2
GLOBULIN SER CALC-MCNC: 2.6 G/DL (ref 1.9–3.5)
GLUCOSE SERPL-MCNC: 96 MG/DL (ref 65–99)
HCT VFR BLD AUTO: 41.1 % (ref 42–52)
HDLC SERPL-MCNC: 47 MG/DL
HGB BLD-MCNC: 14.4 G/DL (ref 14–18)
LDLC SERPL CALC-MCNC: 155 MG/DL
MCH RBC QN AUTO: 30.4 PG (ref 27–33)
MCHC RBC AUTO-ENTMCNC: 35 G/DL (ref 32.3–36.5)
MCV RBC AUTO: 86.7 FL (ref 81.4–97.8)
MICROALBUMIN UR-MCNC: <1.2 MG/DL
MICROALBUMIN/CREAT UR: NORMAL MG/G (ref 0–30)
PLATELET # BLD AUTO: 368 K/UL (ref 164–446)
PMV BLD AUTO: 9.1 FL (ref 9–12.9)
POTASSIUM SERPL-SCNC: 3.9 MMOL/L (ref 3.6–5.5)
PROT SERPL-MCNC: 7.3 G/DL (ref 6–8.2)
PSA SERPL-MCNC: 1.55 NG/ML (ref 0–4)
RBC # BLD AUTO: 4.74 M/UL (ref 4.7–6.1)
SODIUM SERPL-SCNC: 140 MMOL/L (ref 135–145)
TRIGL SERPL-MCNC: 232 MG/DL (ref 0–149)
WBC # BLD AUTO: 6.4 K/UL (ref 4.8–10.8)

## 2024-03-23 PROCEDURE — 82043 UR ALBUMIN QUANTITATIVE: CPT

## 2024-03-23 PROCEDURE — 36415 COLL VENOUS BLD VENIPUNCTURE: CPT

## 2024-03-23 PROCEDURE — 80053 COMPREHEN METABOLIC PANEL: CPT

## 2024-03-23 PROCEDURE — 82306 VITAMIN D 25 HYDROXY: CPT

## 2024-03-23 PROCEDURE — 84153 ASSAY OF PSA TOTAL: CPT

## 2024-03-23 PROCEDURE — 80061 LIPID PANEL: CPT

## 2024-03-23 PROCEDURE — 85027 COMPLETE CBC AUTOMATED: CPT

## 2024-03-23 PROCEDURE — 82570 ASSAY OF URINE CREATININE: CPT

## 2024-07-29 ENCOUNTER — PATIENT MESSAGE (OUTPATIENT)
Dept: MEDICAL GROUP | Facility: MEDICAL CENTER | Age: 59
End: 2024-07-29
Payer: COMMERCIAL

## 2024-07-31 RX ORDER — AMLODIPINE BESYLATE 5 MG/1
5 TABLET ORAL DAILY
Qty: 90 TABLET | Refills: 0 | Status: SHIPPED | OUTPATIENT
Start: 2024-07-31

## 2024-10-31 RX ORDER — AMLODIPINE BESYLATE 5 MG/1
5 TABLET ORAL DAILY
Qty: 90 TABLET | Refills: 0 | Status: SHIPPED | OUTPATIENT
Start: 2024-10-31

## 2024-11-14 ENCOUNTER — TELEPHONE (OUTPATIENT)
Dept: HEALTH INFORMATION MANAGEMENT | Facility: OTHER | Age: 59
End: 2024-11-14

## 2024-11-14 ENCOUNTER — OFFICE VISIT (OUTPATIENT)
Dept: MEDICAL GROUP | Facility: MEDICAL CENTER | Age: 59
End: 2024-11-14
Payer: COMMERCIAL

## 2024-11-14 VITALS
RESPIRATION RATE: 16 BRPM | TEMPERATURE: 97.6 F | SYSTOLIC BLOOD PRESSURE: 124 MMHG | WEIGHT: 184 LBS | OXYGEN SATURATION: 97 % | DIASTOLIC BLOOD PRESSURE: 72 MMHG | HEART RATE: 62 BPM | HEIGHT: 68 IN | BODY MASS INDEX: 27.89 KG/M2

## 2024-11-14 DIAGNOSIS — Z87.891 HISTORY OF TOBACCO USE DISORDER: ICD-10-CM

## 2024-11-14 DIAGNOSIS — Z91.89 OTHER SPECIFIED PERSONAL RISK FACTORS, NOT ELSEWHERE CLASSIFIED: ICD-10-CM

## 2024-11-14 DIAGNOSIS — E78.5 HYPERLIPIDEMIA, UNSPECIFIED HYPERLIPIDEMIA TYPE: ICD-10-CM

## 2024-11-14 DIAGNOSIS — R23.8 OTHER SKIN CHANGES: ICD-10-CM

## 2024-11-14 DIAGNOSIS — E55.9 VITAMIN D DEFICIENCY: ICD-10-CM

## 2024-11-14 DIAGNOSIS — Z12.5 ENCOUNTER FOR SCREENING FOR MALIGNANT NEOPLASM OF PROSTATE: ICD-10-CM

## 2024-11-14 DIAGNOSIS — I10 ESSENTIAL HYPERTENSION: ICD-10-CM

## 2024-11-14 PROCEDURE — 3078F DIAST BP <80 MM HG: CPT | Performed by: NURSE PRACTITIONER

## 2024-11-14 PROCEDURE — 3074F SYST BP LT 130 MM HG: CPT | Performed by: NURSE PRACTITIONER

## 2024-11-14 PROCEDURE — 99214 OFFICE O/P EST MOD 30 MIN: CPT | Performed by: NURSE PRACTITIONER

## 2024-11-14 RX ORDER — AMLODIPINE BESYLATE 5 MG/1
5 TABLET ORAL DAILY
Qty: 90 TABLET | Refills: 3 | Status: SHIPPED | OUTPATIENT
Start: 2024-11-14

## 2024-11-14 ASSESSMENT — FIBROSIS 4 INDEX: FIB4 SCORE: 0.69

## 2024-11-14 ASSESSMENT — PATIENT HEALTH QUESTIONNAIRE - PHQ9: CLINICAL INTERPRETATION OF PHQ2 SCORE: 0

## 2024-11-14 NOTE — PROGRESS NOTES
"Subjective:     Sathya Chan is a 59 y.o. male presents to discuss:   Chief Complaint   Patient presents with    Referral Needed     derm    Medication Refill     Verbal consent was acquired by the patient to use [employer] ambient listening note generation during this visit Yes   History of Present Illness  The patient presents to request referral to dermatology for skin changes, refill on his blood pressure medication.    He reports that he has a dry spot on his right cheek for several years and reports in the past that a biopsy which did not reveal any significant findings.  However recently he has noticed a darkening of the spot which she believes resembles an \"age spot \"denies any bleeding or pain associated with this however does not improve.  Also he notes having a bump on the top of his head that been present for several years as well.  Is not painful but does become irritated when he chun his hair.  He also notes on the left lateral ankle he has a small nodule like lesion has been present for years as well history of attempted removal with cryo however no improvement.  He would like to consult dermatology.    Requesting refill on his amlodipine 5 mg.  He takes his for his blood pressure which is controlled at this time at 124/72 the clinic.  Denies any chest, shortness breath or dizziness.    History of tobacco use quit in 2018.    Elevated cholesterol-last lipid panel in March 2024.  -Currently not on statin therapy.  Have discussed elevated ASCVD risk.  He does note that his father and several other family members have had heart issues and believes they have had heart attacks however he does not have specifics.      SOCIAL HISTORY  He quit smoking in 2018.      The 10-year ASCVD risk score (Reese POE, et al., 2019) is: 10.4%    Values used to calculate the score:      Age: 59 years      Sex: Male      Is Non- : No      Diabetic: No      Tobacco smoker: No      Systolic Blood " "Pressure: 124 mmHg      Is BP treated: No      HDL Cholesterol: 47 mg/dL      Total Cholesterol: 248 mg/dL      ROS: : see above      Current Outpatient Medications:     amLODIPine (NORVASC) 5 MG Tab, Take 1 Tablet by mouth every day., Disp: 90 Tablet, Rfl: 3    No Known Allergies    Objective:     Vitals: /72   Pulse 62   Temp 36.4 °C (97.6 °F)   Resp 16   Ht 1.727 m (5' 8\")   Wt 83.5 kg (184 lb)   SpO2 97%   BMI 27.98 kg/m²    General: Alert, pleasant, NAD  HEENT: Normocephalic.  Neck supple.   Respiratory: no distress, no audible wheezing, RR -WNL  Heart: Heart rate regular, no murmur.  Skin: Warm, dry, no rashes.  Left lateral ankle small subcentimeter nodule.  Right cheek along zygomatic process flat macular hyperpigmented lesion with dry flaky skin.,  Scalp with a small approximately 3-4mm sized lesion  Extremities: No leg edema. No discoloration  Neurological: No tremors  Psych:  Affect/mood is normal, judgement is good, memory is intact, grooming is appropriate.      Assessment/Plan:      Assessment & Plan  Hyperpigmented skin lesion.  The lesion on his right cheek just along zygomatic process does appears hyperpigmented, flat, dry and flaky.  Left lateral ankle with suspected dermatofibroma.  A referral to Dermatology will be made for further evaluation.    Hypercholesterolemia.  Persistently elevated.  Not on statin therapy.  10-year cardiovascular risk is over 10%.  Recommend starting statin therapy.  Patient is agreeable to completing coronary artery calcium scan for further CV burden evaluation.  Continue lifestyle modifications, exercise, weight loss.  Update lipid panel March 1, 2025, followed to review.    Hypertension  Chronic, blood pressure well-controlled at 124/72 with amlodipine 5 mg.  He will continue current regimen.  Due for routine labs March 2025 which I have ordered for him now and he will follow-up to review.    Health Maintenance.  Due for colonoscopy September 2025.   - A " low-dose CT scan of the chest will be ordered for lung cancer screening. -A coronary artery calcium scan will also be ordered.      1. Other skin changes  - Referral to Dermatology    2. Essential hypertension  - amLODIPine (NORVASC) 5 MG Tab; Take 1 Tablet by mouth every day.  Dispense: 90 Tablet; Refill: 3  - MICROALBUMIN CREAT RATIO URINE; Future  - CBC WITHOUT DIFFERENTIAL; Future    3. Hyperlipidemia, unspecified hyperlipidemia type  - Lipid Profile; Future  - Comp Metabolic Panel; Future  - TSH; Future  - Lipoprotein (a); Future  - CT-CARDIAC SCORING; Future    4. History of tobacco use disorder  - REFERRAL TO LUNG CANCER SCREENING PROGRAM    5. Vitamin D deficiency  - VITAMIN D,25 HYDROXY (DEFICIENCY); Future    6. Encounter for screening for malignant neoplasm of prostate  - PROSTATE SPECIFIC AG SCREENING; Future    7. Other specified personal risk factors, not elsewhere classified  - CT-CARDIAC SCORING; Future       Return in about 6 months (around 5/14/2025).    {I have placed the above orders and discussed them with an approved delegating provider. The MA is performing the below orders under the direction of Dr. Familia KEMP

## 2024-11-15 NOTE — TELEPHONE ENCOUNTER
Outcome:   Called to verify program eligibility/ -pt not eligible    Eligibility Screening Questions & Answer (LUNG CANCER SCREENING)    1.  Age 50-77 yrs of age? (age of pt) 59 yrs old    2. Total Years Smoking cigarettes: = 33  yrs of Smoking       3. 20 pack year hx of smoking, or greater (average packs per day)?  33 pack  yrs of smoking      33 YRS OF SMOKING X 0.50 CIGARETTES PACK/DAY= 16.5  PACK YEARS OF SMOKING     4. Current smoker or if quit, has pt quit within last 15 yrs?   FORMER SMOKER, ( QUIT 2018)    5. Completed Lung Cancer screen CT with [facility] previously?No     6.  Anything noted on previous CT involving lungs? (Nodules, Mass, Etc.)   (Yes- detailed notes- date of CT or No or N/A) NO    7. Any signs or symptoms of lung cancer? ( New / change to Cough, Wheezing, S.O.B., coughing up blood, unexplained weight loss within last year=None      8. Previous history of lung cancer? NONE  (Yes- Details including dates or NONE or N/A)

## 2024-12-02 ENCOUNTER — HOSPITAL ENCOUNTER (OUTPATIENT)
Dept: RADIOLOGY | Facility: MEDICAL CENTER | Age: 59
End: 2024-12-02
Attending: NURSE PRACTITIONER
Payer: COMMERCIAL

## 2024-12-02 DIAGNOSIS — E78.5 HYPERLIPIDEMIA, UNSPECIFIED HYPERLIPIDEMIA TYPE: ICD-10-CM

## 2024-12-02 DIAGNOSIS — Z91.89 OTHER SPECIFIED PERSONAL RISK FACTORS, NOT ELSEWHERE CLASSIFIED: ICD-10-CM

## 2024-12-02 PROCEDURE — 4410556 CT-CARDIAC SCORING (SELF PAY ONLY)

## 2025-01-02 ENCOUNTER — APPOINTMENT (OUTPATIENT)
Dept: DERMATOLOGY | Facility: IMAGING CENTER | Age: 60
End: 2025-01-02
Payer: COMMERCIAL

## 2025-01-27 ENCOUNTER — OFFICE VISIT (OUTPATIENT)
Dept: DERMATOLOGY | Facility: IMAGING CENTER | Age: 60
End: 2025-01-27
Payer: COMMERCIAL

## 2025-01-27 DIAGNOSIS — D18.01 CHERRY ANGIOMA: ICD-10-CM

## 2025-01-27 DIAGNOSIS — L82.1 SK (SEBORRHEIC KERATOSIS): ICD-10-CM

## 2025-01-27 DIAGNOSIS — D23.9 DERMATOFIBROMA: ICD-10-CM

## 2025-01-27 PROCEDURE — 99212 OFFICE O/P EST SF 10 MIN: CPT | Performed by: NURSE PRACTITIONER

## 2025-01-27 NOTE — PROGRESS NOTES
DERMATOLOGY NOTE  NEW VISIT       Chief complaint: Establish Care (Spot check)  Has numerous spots of concern          History of skin cancer: No  History of precancers/actinic keratoses: No  History of biopsies:Yes, Details: Rt cheek benign  History of blistering/severe sunburns:Yes, Details:    Family history of skin cancer:No  Family history of atypical moles:No      No Known Allergies     MEDICATIONS:  Medications relevant to specialty reviewed.     REVIEW OF SYSTEMS:   Positive for skin (see HPI)  Negative for fevers and chills       EXAM:  There were no vitals taken for this visit.  Constitutional: Well-developed, well-nourished, and in no distress.     A focused skin exam was performed including the affected areas of the scalp, trunk and LLE. Notable findings on exam today listed below and/or in assessment/plan.     Dusky, brown fibrous papule to distal LLE  Few brown light brown medium brown stuck-on waxy papules scattered on the trunk and scalp  Several scattered 1-3mm bright red macules and thin papules on the trunk and extremities      IMPRESSION / PLAN:    1. SK (seborrheic keratosis)  - Benign-appearing nature of lesions discussed during exam.   - reassurance provided, courtesy LN2 applied to SK on vertex scalp, interferes with haircare  - Advised to continue to monitor for any return to clinic for new or concerning changes.      2. Cherry angioma  - Benign-appearing nature of lesions discussed during exam.   - Advised to continue to monitor for any return to clinic for new or concerning changes.      3. Dermatofibroma (scar)  - Benign-appearing nature of lesions discussed during exam.   - Advised to continue to monitor for any return to clinic for new or concerning changes.        Discussed risks associated with LN2, Patient verbalized understanding and agrees with plan regarding the above              Please note that this dictation was created using voice recognition software. I have made every  reasonable attempt to correct obvious errors, but I expect that there are errors of grammar and possibly content that I did not discover before finalizing the note.      Return to clinic in: Return for PRN. and as needed for any new or changing skin lesions.